# Patient Record
Sex: FEMALE | Race: WHITE | Employment: OTHER | URBAN - METROPOLITAN AREA
[De-identification: names, ages, dates, MRNs, and addresses within clinical notes are randomized per-mention and may not be internally consistent; named-entity substitution may affect disease eponyms.]

---

## 2018-01-01 ENCOUNTER — PREP FOR PROCEDURE (OUTPATIENT)
Dept: VASCULAR SURGERY | Age: 78
End: 2018-01-01

## 2018-01-01 ENCOUNTER — HOSPITAL ENCOUNTER (INPATIENT)
Age: 78
LOS: 2 days | DRG: 441 | End: 2018-12-15
Attending: EMERGENCY MEDICINE | Admitting: INTERNAL MEDICINE
Payer: MEDICARE

## 2018-01-01 ENCOUNTER — APPOINTMENT (OUTPATIENT)
Dept: CT IMAGING | Age: 78
End: 2018-01-01
Payer: MEDICARE

## 2018-01-01 ENCOUNTER — OFFICE VISIT (OUTPATIENT)
Dept: FAMILY MEDICINE CLINIC | Age: 78
End: 2018-01-01
Payer: MEDICARE

## 2018-01-01 ENCOUNTER — ANESTHESIA EVENT (OUTPATIENT)
Dept: OPERATING ROOM | Age: 78
End: 2018-01-01
Payer: MEDICARE

## 2018-01-01 ENCOUNTER — TELEPHONE (OUTPATIENT)
Dept: FAMILY MEDICINE CLINIC | Age: 78
End: 2018-01-01

## 2018-01-01 ENCOUNTER — APPOINTMENT (OUTPATIENT)
Dept: GENERAL RADIOLOGY | Age: 78
DRG: 441 | End: 2018-01-01
Payer: MEDICARE

## 2018-01-01 ENCOUNTER — APPOINTMENT (OUTPATIENT)
Dept: CT IMAGING | Age: 78
DRG: 441 | End: 2018-01-01
Payer: MEDICARE

## 2018-01-01 ENCOUNTER — ANESTHESIA (OUTPATIENT)
Dept: OPERATING ROOM | Age: 78
End: 2018-01-01
Payer: MEDICARE

## 2018-01-01 ENCOUNTER — APPOINTMENT (OUTPATIENT)
Dept: ULTRASOUND IMAGING | Age: 78
DRG: 441 | End: 2018-01-01
Payer: MEDICARE

## 2018-01-01 ENCOUNTER — HOSPITAL ENCOUNTER (OUTPATIENT)
Age: 78
Discharge: HOME | End: 2018-06-09
Payer: MEDICARE

## 2018-01-01 ENCOUNTER — APPOINTMENT (OUTPATIENT)
Dept: GENERAL RADIOLOGY | Age: 78
End: 2018-01-01
Payer: MEDICARE

## 2018-01-01 ENCOUNTER — HOSPITAL ENCOUNTER (OUTPATIENT)
Age: 78
Discharge: HOME | End: 2018-06-27
Payer: MEDICARE

## 2018-01-01 ENCOUNTER — HOSPITAL ENCOUNTER (OUTPATIENT)
Age: 78
Setting detail: OUTPATIENT SURGERY
Discharge: HOME | End: 2018-03-14
Attending: SPECIALIST | Admitting: SPECIALIST
Payer: MEDICARE

## 2018-01-01 ENCOUNTER — HOSPITAL ENCOUNTER (EMERGENCY)
Age: 78
Discharge: HOME | End: 2018-12-10
Attending: EMERGENCY MEDICINE
Payer: MEDICARE

## 2018-01-01 VITALS
RESPIRATION RATE: 18 BRPM | BODY MASS INDEX: 31.61 KG/M2 | OXYGEN SATURATION: 97 % | HEART RATE: 99 BPM | DIASTOLIC BLOOD PRESSURE: 80 MMHG | TEMPERATURE: 98.1 F | SYSTOLIC BLOOD PRESSURE: 148 MMHG | HEIGHT: 60 IN | WEIGHT: 161 LBS

## 2018-01-01 VITALS
SYSTOLIC BLOOD PRESSURE: 49 MMHG | OXYGEN SATURATION: 93 % | WEIGHT: 170.2 LBS | BODY MASS INDEX: 33.41 KG/M2 | DIASTOLIC BLOOD PRESSURE: 38 MMHG | HEIGHT: 60 IN | HEART RATE: 41 BPM | TEMPERATURE: 96 F

## 2018-01-01 VITALS
OXYGEN SATURATION: 98 % | SYSTOLIC BLOOD PRESSURE: 124 MMHG | TEMPERATURE: 98.4 F | DIASTOLIC BLOOD PRESSURE: 74 MMHG | HEART RATE: 104 BPM | WEIGHT: 170 LBS | HEIGHT: 60 IN | RESPIRATION RATE: 20 BRPM | BODY MASS INDEX: 33.38 KG/M2

## 2018-01-01 VITALS
DIASTOLIC BLOOD PRESSURE: 82 MMHG | RESPIRATION RATE: 16 BRPM | SYSTOLIC BLOOD PRESSURE: 128 MMHG | HEIGHT: 60 IN | OXYGEN SATURATION: 95 % | TEMPERATURE: 98.2 F | WEIGHT: 160 LBS | HEART RATE: 103 BPM | BODY MASS INDEX: 31.41 KG/M2

## 2018-01-01 VITALS
WEIGHT: 167 LBS | OXYGEN SATURATION: 96 % | TEMPERATURE: 97.8 F | RESPIRATION RATE: 16 BRPM | HEIGHT: 61 IN | DIASTOLIC BLOOD PRESSURE: 76 MMHG | HEART RATE: 80 BPM | BODY MASS INDEX: 31.53 KG/M2 | SYSTOLIC BLOOD PRESSURE: 130 MMHG

## 2018-01-01 VITALS
SYSTOLIC BLOOD PRESSURE: 158 MMHG | RESPIRATION RATE: 23 BRPM | OXYGEN SATURATION: 97 % | BODY MASS INDEX: 31.41 KG/M2 | DIASTOLIC BLOOD PRESSURE: 113 MMHG | HEIGHT: 60 IN | HEART RATE: 103 BPM | TEMPERATURE: 98 F | WEIGHT: 160 LBS

## 2018-01-01 VITALS
OXYGEN SATURATION: 97 % | RESPIRATION RATE: 16 BRPM | TEMPERATURE: 97.8 F | DIASTOLIC BLOOD PRESSURE: 80 MMHG | SYSTOLIC BLOOD PRESSURE: 124 MMHG | HEART RATE: 106 BPM | HEIGHT: 60 IN | WEIGHT: 170 LBS | BODY MASS INDEX: 33.38 KG/M2

## 2018-01-01 VITALS
BODY MASS INDEX: 31.53 KG/M2 | SYSTOLIC BLOOD PRESSURE: 104 MMHG | HEIGHT: 61 IN | DIASTOLIC BLOOD PRESSURE: 70 MMHG | TEMPERATURE: 98.2 F | RESPIRATION RATE: 16 BRPM | OXYGEN SATURATION: 99 % | HEART RATE: 95 BPM | WEIGHT: 167 LBS

## 2018-01-01 VITALS
OXYGEN SATURATION: 99 % | RESPIRATION RATE: 16 BRPM | DIASTOLIC BLOOD PRESSURE: 87 MMHG | SYSTOLIC BLOOD PRESSURE: 158 MMHG | TEMPERATURE: 98.8 F

## 2018-01-01 DIAGNOSIS — L02.31 ABSCESS OF LEFT BUTTOCK: ICD-10-CM

## 2018-01-01 DIAGNOSIS — I50.9 ACUTE CONGESTIVE HEART FAILURE, UNSPECIFIED HEART FAILURE TYPE (HCC): Primary | ICD-10-CM

## 2018-01-01 DIAGNOSIS — I77.0 AV FISTULA (HCC): Primary | ICD-10-CM

## 2018-01-01 DIAGNOSIS — K59.00 OBSTIPATION: ICD-10-CM

## 2018-01-01 DIAGNOSIS — L30.9 ECZEMA, UNSPECIFIED TYPE: ICD-10-CM

## 2018-01-01 DIAGNOSIS — F32.A DEPRESSION, UNSPECIFIED DEPRESSION TYPE: ICD-10-CM

## 2018-01-01 DIAGNOSIS — M19.90 ARTHRITIS: Primary | ICD-10-CM

## 2018-01-01 DIAGNOSIS — M10.141 LEAD-INDUCED ACUTE GOUT OF RIGHT HAND, INITIAL ENCOUNTER: Primary | ICD-10-CM

## 2018-01-01 DIAGNOSIS — M1A.1490: ICD-10-CM

## 2018-01-01 DIAGNOSIS — N17.9 ACUTE RENAL FAILURE, UNSPECIFIED ACUTE RENAL FAILURE TYPE (HCC): Primary | ICD-10-CM

## 2018-01-01 DIAGNOSIS — E03.9 ACQUIRED HYPOTHYROIDISM: ICD-10-CM

## 2018-01-01 DIAGNOSIS — M25.541 ARTHRALGIA OF RIGHT HAND: ICD-10-CM

## 2018-01-01 DIAGNOSIS — M10.141 LEAD-INDUCED ACUTE GOUT OF RIGHT HAND, INITIAL ENCOUNTER: ICD-10-CM

## 2018-01-01 DIAGNOSIS — D82.4 RECURRING COLD STAPHYLOCOCCAL ABSCESSES (HCC): ICD-10-CM

## 2018-01-01 DIAGNOSIS — T56.0X1A LEAD-INDUCED ACUTE GOUT OF RIGHT HAND, INITIAL ENCOUNTER: ICD-10-CM

## 2018-01-01 DIAGNOSIS — E79.0 HYPERURICEMIA: ICD-10-CM

## 2018-01-01 DIAGNOSIS — E16.2 HYPOGLYCEMIA: ICD-10-CM

## 2018-01-01 DIAGNOSIS — T56.0X1A LEAD-INDUCED ACUTE GOUT OF RIGHT HAND, INITIAL ENCOUNTER: Primary | ICD-10-CM

## 2018-01-01 DIAGNOSIS — T56.0X1A: ICD-10-CM

## 2018-01-01 DIAGNOSIS — E87.5 HYPERKALEMIA: ICD-10-CM

## 2018-01-01 DIAGNOSIS — Z00.00 ROUTINE GENERAL MEDICAL EXAMINATION AT A HEALTH CARE FACILITY: Primary | ICD-10-CM

## 2018-01-01 DIAGNOSIS — K59.00 OBSTIPATION: Primary | ICD-10-CM

## 2018-01-01 DIAGNOSIS — I10 ESSENTIAL HYPERTENSION: Primary | ICD-10-CM

## 2018-01-01 DIAGNOSIS — F32.2 MAJOR DEPRESSIVE DISORDER, SINGLE EPISODE, SEVERE WITHOUT PSYCHOTIC FEATURES (HCC): ICD-10-CM

## 2018-01-01 DIAGNOSIS — L02.31 ABSCESS OF LEFT BUTTOCK: Primary | ICD-10-CM

## 2018-01-01 DIAGNOSIS — I10 ESSENTIAL HYPERTENSION: ICD-10-CM

## 2018-01-01 DIAGNOSIS — L89.322 PRESSURE INJURY OF LEFT BUTTOCK, STAGE 2 (HCC): ICD-10-CM

## 2018-01-01 DIAGNOSIS — E86.0 DEHYDRATION: ICD-10-CM

## 2018-01-01 DIAGNOSIS — K72.00 ACUTE LIVER FAILURE WITHOUT HEPATIC COMA: ICD-10-CM

## 2018-01-01 DIAGNOSIS — R41.82 ALTERED MENTAL STATUS, UNSPECIFIED ALTERED MENTAL STATUS TYPE: ICD-10-CM

## 2018-01-01 DIAGNOSIS — N18.4 CHRONIC KIDNEY DISEASE, STAGE IV (SEVERE) (HCC): ICD-10-CM

## 2018-01-01 LAB
ABO/RH: NORMAL
ACETAMINOPHEN LEVEL: <5 MCG/ML (ref 10–30)
ALBUMIN SERPL-MCNC: 3.2 G/DL (ref 3.5–5.2)
ALBUMIN SERPL-MCNC: 4.1 G/DL (ref 3.5–5.2)
ALBUMIN SERPL-MCNC: 4.1 G/DL (ref 3.5–5.2)
ALP BLD-CCNC: 70 U/L (ref 35–104)
ALP BLD-CCNC: 71 U/L (ref 35–104)
ALP BLD-CCNC: 80 U/L (ref 35–104)
ALP BLD-CCNC: 82 U/L (ref 35–104)
ALP BLD-CCNC: 88 U/L (ref 35–104)
ALT SERPL-CCNC: 4019 U/L (ref 0–32)
ALT SERPL-CCNC: 41 U/L (ref 0–32)
ALT SERPL-CCNC: 4108 U/L (ref 0–32)
ALT SERPL-CCNC: 4292 U/L (ref 0–32)
ALT SERPL-CCNC: 4664 U/L (ref 0–32)
AMMONIA: 32 UMOL/L (ref 11–51)
AMMONIA: 34 UMOL/L (ref 11–51)
AMMONIA: 69 UMOL/L (ref 11–51)
AMMONIA: 74 UMOL/L (ref 11–51)
AMMONIA: 87 UMOL/L (ref 11–51)
AMPHETAMINE SCREEN, URINE: POSITIVE
ANION GAP SERPL CALCULATED.3IONS-SCNC: 14 MMOL/L (ref 7–16)
ANION GAP SERPL CALCULATED.3IONS-SCNC: 17 MMOL/L (ref 7–16)
ANION GAP SERPL CALCULATED.3IONS-SCNC: 25 MMOL/L (ref 7–16)
ANION GAP SERPL CALCULATED.3IONS-SCNC: 25 MMOL/L (ref 7–16)
ANION GAP SERPL CALCULATED.3IONS-SCNC: 26 MMOL/L (ref 7–16)
ANION GAP SERPL CALCULATED.3IONS-SCNC: 26 MMOL/L (ref 7–16)
ANION GAP SERPL CALCULATED.3IONS-SCNC: 27 MMOL/L (ref 7–16)
ANION GAP SERPL CALCULATED.3IONS-SCNC: 27 MMOL/L (ref 7–16)
ANTI-MITOCHONDRIAL AB, IFA: NEGATIVE
ANTI-NUCLEAR ANTIBODY (ANA): NEGATIVE
ANTIBODY SCREEN: NORMAL
APTT: 24 SEC (ref 24.5–35.1)
APTT: 38.7 SEC (ref 24.5–35.1)
AST SERPL-CCNC: 33 U/L (ref 0–31)
AST SERPL-CCNC: 5339 U/L (ref 0–31)
AST SERPL-CCNC: 5541 U/L (ref 0–31)
AST SERPL-CCNC: 6287 U/L (ref 0–31)
AST SERPL-CCNC: >7000 U/L (ref 0–31)
B.E.: -10.3 MMOL/L (ref -3–3)
B.E.: -15.1 MMOL/L (ref -3–3)
B.E.: -16.1 MMOL/L (ref -3–3)
B.E.: -6.3 MMOL/L (ref -3–3)
BACTERIA: ABNORMAL /HPF
BACTERIA: NORMAL /HPF
BARBITURATE SCREEN URINE: NOT DETECTED
BASOPHILS ABSOLUTE: 0 E9/L (ref 0–0.2)
BASOPHILS ABSOLUTE: 0 E9/L (ref 0–0.2)
BASOPHILS ABSOLUTE: 0.04 E9/L (ref 0–0.2)
BASOPHILS ABSOLUTE: 0.04 E9/L (ref 0–0.2)
BASOPHILS RELATIVE PERCENT: 0 % (ref 0–2)
BASOPHILS RELATIVE PERCENT: 0.1 % (ref 0–2)
BASOPHILS RELATIVE PERCENT: 0.7 % (ref 0–2)
BASOPHILS RELATIVE PERCENT: 0.8 % (ref 0–2)
BENZODIAZEPINE SCREEN, URINE: NOT DETECTED
BILIRUB SERPL-MCNC: 0.6 MG/DL (ref 0–1.2)
BILIRUB SERPL-MCNC: 1.9 MG/DL (ref 0–1.2)
BILIRUB SERPL-MCNC: 2.2 MG/DL (ref 0–1.2)
BILIRUB SERPL-MCNC: 2.9 MG/DL (ref 0–1.2)
BILIRUB SERPL-MCNC: 3 MG/DL (ref 0–1.2)
BILIRUBIN DIRECT: 1.1 MG/DL (ref 0–0.3)
BILIRUBIN DIRECT: 1.3 MG/DL (ref 0–0.3)
BILIRUBIN DIRECT: 1.6 MG/DL (ref 0–0.3)
BILIRUBIN URINE: NEGATIVE
BILIRUBIN URINE: NEGATIVE
BILIRUBIN, INDIRECT: 0.8 MG/DL (ref 0–1)
BILIRUBIN, INDIRECT: 1.3 MG/DL (ref 0–1)
BLOOD BANK DISPENSE STATUS: NORMAL
BLOOD BANK PRODUCT CODE: NORMAL
BLOOD CULTURE, ROUTINE: NORMAL
BLOOD, URINE: ABNORMAL
BLOOD, URINE: ABNORMAL
BPU ID: NORMAL
BUN BLDV-MCNC: 28 MG/DL (ref 8–23)
BUN BLDV-MCNC: 43 MG/DL (ref 8–23)
BUN BLDV-MCNC: 45 MG/DL (ref 8–23)
BUN BLDV-MCNC: 46 MG/DL (ref 8–23)
BUN BLDV-MCNC: 48 MG/DL (ref 8–23)
BUN BLDV-MCNC: 52 MG/DL (ref 8–23)
BUN BLDV-MCNC: 65 MG/DL (ref 8–23)
BUN BLDV-MCNC: 66 MG/DL (ref 8–23)
BURR CELLS: ABNORMAL
CALCIUM SERPL-MCNC: 10.3 MG/DL (ref 8.6–10.2)
CALCIUM SERPL-MCNC: 10.4 MG/DL (ref 8.6–10.2)
CALCIUM SERPL-MCNC: 7.4 MG/DL (ref 8.6–10.2)
CALCIUM SERPL-MCNC: 7.5 MG/DL (ref 8.6–10.2)
CALCIUM SERPL-MCNC: 7.6 MG/DL (ref 8.6–10.2)
CALCIUM SERPL-MCNC: 8.1 MG/DL (ref 8.6–10.2)
CALCIUM SERPL-MCNC: 8.8 MG/DL (ref 8.6–10.2)
CALCIUM SERPL-MCNC: 9.7 MG/DL (ref 8.6–10.2)
CANNABINOID SCREEN URINE: NOT DETECTED
CERULOPLASMIN: 25 MG/DL (ref 16–45)
CHLORIDE BLD-SCNC: 100 MMOL/L (ref 98–107)
CHLORIDE BLD-SCNC: 105 MMOL/L (ref 98–107)
CHLORIDE BLD-SCNC: 91 MMOL/L (ref 98–107)
CHLORIDE BLD-SCNC: 93 MMOL/L (ref 98–107)
CHLORIDE BLD-SCNC: 93 MMOL/L (ref 98–107)
CHLORIDE BLD-SCNC: 99 MMOL/L (ref 98–107)
CHOLESTEROL, TOTAL: 79 MG/DL (ref 0–199)
CK MB: 37.5 NG/ML (ref 0–4.3)
CLARITY: ABNORMAL
CLARITY: CLEAR
CO2: 12 MMOL/L (ref 22–29)
CO2: 13 MMOL/L (ref 22–29)
CO2: 15 MMOL/L (ref 22–29)
CO2: 18 MMOL/L (ref 22–29)
CO2: 19 MMOL/L (ref 22–29)
CO2: 20 MMOL/L (ref 22–29)
CO2: 23 MMOL/L (ref 22–29)
CO2: 24 MMOL/L (ref 22–29)
COCAINE METABOLITE SCREEN URINE: NOT DETECTED
COHB: 0.3 % (ref 0–1.5)
COLOR: ABNORMAL
COLOR: YELLOW
COMMENT: ABNORMAL
CREAT SERPL-MCNC: 2.8 MG/DL (ref 0.5–1)
CREAT SERPL-MCNC: 2.8 MG/DL (ref 0.5–1)
CREAT SERPL-MCNC: 2.9 MG/DL (ref 0.5–1)
CREAT SERPL-MCNC: 3.4 MG/DL (ref 0.5–1)
CREAT SERPL-MCNC: 3.5 MG/DL (ref 0.5–1)
CREAT SERPL-MCNC: 4 MG/DL (ref 0.5–1)
CREAT SERPL-MCNC: 4.8 MG/DL (ref 0.5–1)
CREAT SERPL-MCNC: 4.9 MG/DL (ref 0.5–1)
CRITICAL: ABNORMAL
CULTURE, BLOOD 2: ABNORMAL
CULTURE, BLOOD 2: NORMAL
D DIMER: >5250 NG/ML DDU
DAT POLYSPECIFIC: NORMAL
DATE ANALYZED: ABNORMAL
DATE OF COLLECTION: ABNORMAL
DESCRIPTION BLOOD BANK: NORMAL
EKG ATRIAL RATE: 73 BPM
EKG ATRIAL RATE: 84 BPM
EKG ATRIAL RATE: 95 BPM
EKG P AXIS: 19 DEGREES
EKG P AXIS: 55 DEGREES
EKG P AXIS: 64 DEGREES
EKG P-R INTERVAL: 146 MS
EKG P-R INTERVAL: 166 MS
EKG P-R INTERVAL: 168 MS
EKG Q-T INTERVAL: 372 MS
EKG Q-T INTERVAL: 416 MS
EKG Q-T INTERVAL: 466 MS
EKG QRS DURATION: 86 MS
EKG QRS DURATION: 90 MS
EKG QRS DURATION: 94 MS
EKG QTC CALCULATION (BAZETT): 467 MS
EKG QTC CALCULATION (BAZETT): 491 MS
EKG QTC CALCULATION (BAZETT): 513 MS
EKG R AXIS: 20 DEGREES
EKG R AXIS: 56 DEGREES
EKG R AXIS: 61 DEGREES
EKG T AXIS: 16 DEGREES
EKG T AXIS: 35 DEGREES
EKG T AXIS: 61 DEGREES
EKG VENTRICULAR RATE: 73 BPM
EKG VENTRICULAR RATE: 84 BPM
EKG VENTRICULAR RATE: 95 BPM
EOSINOPHILS ABSOLUTE: 0 E9/L (ref 0.05–0.5)
EOSINOPHILS ABSOLUTE: 0.11 E9/L (ref 0.05–0.5)
EOSINOPHILS ABSOLUTE: 0.19 E9/L (ref 0.05–0.5)
EOSINOPHILS ABSOLUTE: 0.27 E9/L (ref 0.05–0.5)
EOSINOPHILS RELATIVE PERCENT: 0 % (ref 0–6)
EOSINOPHILS RELATIVE PERCENT: 1.8 % (ref 0–6)
EOSINOPHILS RELATIVE PERCENT: 3.4 % (ref 0–6)
EOSINOPHILS RELATIVE PERCENT: 5.2 % (ref 0–6)
EPITHELIAL CELLS, UA: ABNORMAL /HPF
EPSTEIN-BARR VCA IGM: <10 U/ML (ref 0–43.9)
ETHANOL: <10 MG/DL (ref 0–0.08)
FERRITIN: 4792 NG/ML
FIBRINOGEN: <70 MG/DL (ref 225–540)
FOLATE: >20 NG/ML (ref 4.8–24.2)
GFR AFRICAN AMERICAN: 10
GFR AFRICAN AMERICAN: 11
GFR AFRICAN AMERICAN: 13
GFR AFRICAN AMERICAN: 15
GFR AFRICAN AMERICAN: 16
GFR AFRICAN AMERICAN: 19
GFR AFRICAN AMERICAN: 20
GFR AFRICAN AMERICAN: 20
GFR NON-AFRICAN AMERICAN: 11 ML/MIN/1.73
GFR NON-AFRICAN AMERICAN: 13 ML/MIN/1.73
GFR NON-AFRICAN AMERICAN: 13 ML/MIN/1.73
GFR NON-AFRICAN AMERICAN: 16 ML/MIN/1.73
GFR NON-AFRICAN AMERICAN: 9 ML/MIN/1.73
GFR NON-AFRICAN AMERICAN: 9 ML/MIN/1.73
GLUCOSE BLD-MCNC: 104 MG/DL (ref 74–99)
GLUCOSE BLD-MCNC: 108 MG/DL (ref 74–109)
GLUCOSE BLD-MCNC: 124 MG/DL (ref 74–99)
GLUCOSE BLD-MCNC: 138 MG/DL (ref 74–99)
GLUCOSE BLD-MCNC: 150 MG/DL (ref 74–99)
GLUCOSE BLD-MCNC: 178 MG/DL (ref 74–99)
GLUCOSE BLD-MCNC: 180 MG/DL (ref 74–99)
GLUCOSE BLD-MCNC: 215 MG/DL (ref 74–99)
GLUCOSE BLD-MCNC: 86 MG/DL
GLUCOSE BLD-MCNC: 86 MG/DL
GLUCOSE URINE: NEGATIVE MG/DL
GLUCOSE URINE: NEGATIVE MG/DL
GRAM STAIN RESULT: ABNORMAL
HAPTOGLOBIN: <10 MG/DL (ref 30–200)
HAV IGM SER IA-ACNC: NORMAL
HBA1C MFR BLD: 4.9 % (ref 4–5.6)
HBV SURFACE AB TITR SER: NORMAL {TITER}
HCO3: 10.2 MMOL/L (ref 22–26)
HCO3: 10.3 MMOL/L (ref 22–26)
HCO3: 14.1 MMOL/L (ref 22–26)
HCO3: 15.8 MMOL/L (ref 22–26)
HCT VFR BLD CALC: 24.7 % (ref 34–48)
HCT VFR BLD CALC: 29.3 % (ref 34–48)
HCT VFR BLD CALC: 32.3 % (ref 34–48)
HCT VFR BLD CALC: 32.4 % (ref 34–48)
HCT VFR BLD CALC: 32.8 % (ref 34–48)
HDLC SERPL-MCNC: 39 MG/DL
HEMOGLOBIN: 10.2 G/DL (ref 11.5–15.5)
HEMOGLOBIN: 10.8 G/DL (ref 11.5–15.5)
HEMOGLOBIN: 7.5 G/DL (ref 11.5–15.5)
HEMOGLOBIN: 9.7 G/DL (ref 11.5–15.5)
HEMOGLOBIN: 9.8 G/DL (ref 11.5–15.5)
HEPATITIS B CORE IGM ANTIBODY: NORMAL
HEPATITIS B CORE TOTAL ANTIBODY: NONREACTIVE
HEPATITIS B SURFACE ANTIGEN INTERPRETATION: NORMAL
HEPATITIS C ANTIBODY INTERPRETATION: NORMAL
HEPATITIS C ANTIBODY INTERPRETATION: NORMAL
HHB: 2.7 % (ref 0–5)
HHB: 4.7 % (ref 0–5)
HHB: 4.9 % (ref 0–5)
HHB: 5.4 % (ref 0–5)
HYPOCHROMIA: ABNORMAL
IGA: 68 MG/DL (ref 70–400)
IGG: 537 MG/DL (ref 700–1600)
IGM: <25 MG/DL (ref 40–230)
IMMATURE GRANULOCYTES #: 0.01 E9/L
IMMATURE GRANULOCYTES #: 0.02 E9/L
IMMATURE GRANULOCYTES %: 0.2 % (ref 0–5)
IMMATURE GRANULOCYTES %: 0.4 % (ref 0–5)
INR BLD: 1
INR BLD: 2.8
INR BLD: >10
IRON SATURATION: 85 % (ref 15–50)
IRON: 233 MCG/DL (ref 37–145)
KETONES, URINE: ABNORMAL MG/DL
KETONES, URINE: NEGATIVE MG/DL
LAB: ABNORMAL
LACTATE DEHYDROGENASE: 3790 U/L (ref 135–214)
LACTIC ACID: 1.5 MMOL/L (ref 0.5–2.2)
LACTIC ACID: 6.8 MMOL/L (ref 0.5–2.2)
LACTIC ACID: 7.5 MMOL/L (ref 0.5–2.2)
LACTIC ACID: 8.5 MMOL/L (ref 0.5–2.2)
LACTIC ACID: 9.5 MMOL/L (ref 0.5–2.2)
LDL CHOLESTEROL CALCULATED: 21 MG/DL (ref 0–99)
LEUKOCYTE ESTERASE, URINE: ABNORMAL
LEUKOCYTE ESTERASE, URINE: NEGATIVE
LYMPHOCYTES ABSOLUTE: 0.13 E9/L (ref 1.5–4)
LYMPHOCYTES ABSOLUTE: 0.37 E9/L (ref 1.5–4)
LYMPHOCYTES ABSOLUTE: 0.65 E9/L (ref 1.5–4)
LYMPHOCYTES ABSOLUTE: 1.13 E9/L (ref 1.5–4)
LYMPHOCYTES RELATIVE PERCENT: 1.8 % (ref 20–42)
LYMPHOCYTES RELATIVE PERCENT: 11.5 % (ref 20–42)
LYMPHOCYTES RELATIVE PERCENT: 21.6 % (ref 20–42)
LYMPHOCYTES RELATIVE PERCENT: 4.3 % (ref 20–42)
Lab: ABNORMAL
MAGNESIUM: 1.8 MG/DL (ref 1.6–2.6)
MCH RBC QN AUTO: 30.3 PG (ref 26–35)
MCH RBC QN AUTO: 31.6 PG (ref 26–35)
MCH RBC QN AUTO: 31.8 PG (ref 26–35)
MCH RBC QN AUTO: 31.8 PG (ref 26–35)
MCH RBC QN AUTO: 32 PG (ref 26–35)
MCHC RBC AUTO-ENTMCNC: 30 % (ref 32–34.5)
MCHC RBC AUTO-ENTMCNC: 30.4 % (ref 32–34.5)
MCHC RBC AUTO-ENTMCNC: 31.5 % (ref 32–34.5)
MCHC RBC AUTO-ENTMCNC: 32.9 % (ref 32–34.5)
MCHC RBC AUTO-ENTMCNC: 33.4 % (ref 32–34.5)
MCV RBC AUTO: 100.9 FL (ref 80–99.9)
MCV RBC AUTO: 104.7 FL (ref 80–99.9)
MCV RBC AUTO: 106.6 FL (ref 80–99.9)
MCV RBC AUTO: 92.1 FL (ref 80–99.9)
MCV RBC AUTO: 94.5 FL (ref 80–99.9)
METER GLUCOSE: 109 MG/DL (ref 74–99)
METER GLUCOSE: 114 MG/DL (ref 74–99)
METER GLUCOSE: 117 MG/DL (ref 74–99)
METER GLUCOSE: 125 MG/DL (ref 74–99)
METER GLUCOSE: 173 MG/DL (ref 74–99)
METER GLUCOSE: 213 MG/DL (ref 74–99)
METER GLUCOSE: 86 MG/DL (ref 74–99)
METER GLUCOSE: 86 MG/DL (ref 74–99)
METER GLUCOSE: <40 MG/DL (ref 74–99)
METHADONE SCREEN, URINE: NOT DETECTED
METHB: 0.3 % (ref 0–1.5)
METHB: 0.4 % (ref 0–1.5)
METHB: 0.4 % (ref 0–1.5)
METHB: 0.5 % (ref 0–1.5)
MODE: ABNORMAL
MONOCYTES ABSOLUTE: 0.13 E9/L (ref 0.1–0.95)
MONOCYTES ABSOLUTE: 0.37 E9/L (ref 0.1–0.95)
MONOCYTES ABSOLUTE: 0.6 E9/L (ref 0.1–0.95)
MONOCYTES ABSOLUTE: 0.61 E9/L (ref 0.1–0.95)
MONOCYTES RELATIVE PERCENT: 1.8 % (ref 2–12)
MONOCYTES RELATIVE PERCENT: 10.6 % (ref 2–12)
MONOCYTES RELATIVE PERCENT: 11.7 % (ref 2–12)
MONOCYTES RELATIVE PERCENT: 4.3 % (ref 2–12)
MRSA CULTURE ONLY: NORMAL
NEUTROPHILS ABSOLUTE: 3.16 E9/L (ref 1.8–7.3)
NEUTROPHILS ABSOLUTE: 4.16 E9/L (ref 1.8–7.3)
NEUTROPHILS ABSOLUTE: 5.99 E9/L (ref 1.8–7.3)
NEUTROPHILS ABSOLUTE: 8.46 E9/L (ref 1.8–7.3)
NEUTROPHILS RELATIVE PERCENT: 60.3 % (ref 43–80)
NEUTROPHILS RELATIVE PERCENT: 73.6 % (ref 43–80)
NEUTROPHILS RELATIVE PERCENT: 91.3 % (ref 43–80)
NEUTROPHILS RELATIVE PERCENT: 94.7 % (ref 43–80)
NITRITE, URINE: NEGATIVE
NITRITE, URINE: NEGATIVE
NUCLEATED RED BLOOD CELLS: 1.8 /100 WBC
O2 CONTENT: 12.4 ML/DL
O2 CONTENT: 14.2 ML/DL
O2 CONTENT: 14.5 ML/DL
O2 CONTENT: 15.1 ML/DL
O2 SATURATION: 94.6 % (ref 92–98.5)
O2 SATURATION: 95.1 % (ref 92–98.5)
O2 SATURATION: 95.3 % (ref 92–98.5)
O2 SATURATION: 97.3 % (ref 92–98.5)
O2HB: 93.9 % (ref 94–97)
O2HB: 94.5 % (ref 94–97)
O2HB: 94.6 % (ref 94–97)
O2HB: 96.5 % (ref 94–97)
OPERATOR ID: 3
OPERATOR ID: 30
OPERATOR ID: ABNORMAL
OPERATOR ID: ABNORMAL
OPIATE SCREEN URINE: NOT DETECTED
ORGANISM: ABNORMAL
OVALOCYTES: ABNORMAL
OVALOCYTES: ABNORMAL
PATHOLOGIST REVIEW: NORMAL
PATIENT TEMP: 37 C
PCO2: 22.1 MMHG (ref 35–45)
PCO2: 23.3 MMHG (ref 35–45)
PCO2: 26.6 MMHG (ref 35–45)
PCO2: 26.7 MMHG (ref 35–45)
PDW BLD-RTO: 12.6 FL (ref 11.5–15)
PDW BLD-RTO: 13.5 FL (ref 11.5–15)
PDW BLD-RTO: 13.7 FL (ref 11.5–15)
PDW BLD-RTO: 13.7 FL (ref 11.5–15)
PDW BLD-RTO: 14.6 FL (ref 11.5–15)
PH BLOOD GAS: 7.21 (ref 7.35–7.45)
PH BLOOD GAS: 7.26 (ref 7.35–7.45)
PH BLOOD GAS: 7.34 (ref 7.35–7.45)
PH BLOOD GAS: 7.47 (ref 7.35–7.45)
PH UA: 5 (ref 5–9)
PH UA: 5 (ref 5–9)
PHENCYCLIDINE SCREEN URINE: NOT DETECTED
PHOSPHORUS: 3.3 MG/DL (ref 2.5–4.5)
PLATELET # BLD: 201 E9/L (ref 130–450)
PLATELET # BLD: 214 E9/L (ref 130–450)
PLATELET # BLD: 31 E9/L (ref 130–450)
PLATELET # BLD: 38 E9/L (ref 130–450)
PLATELET # BLD: 58 E9/L (ref 130–450)
PLATELET CONFIRMATION: NORMAL
PMV BLD AUTO: 10.9 FL (ref 7–12)
PMV BLD AUTO: 12 FL (ref 7–12)
PMV BLD AUTO: 12 FL (ref 7–12)
PMV BLD AUTO: 12.9 FL (ref 7–12)
PMV BLD AUTO: 9.8 FL (ref 7–12)
PO2: 141.2 MMHG (ref 60–100)
PO2: 83.4 MMHG (ref 60–100)
PO2: 88.3 MMHG (ref 60–100)
PO2: 97.8 MMHG (ref 60–100)
POIKILOCYTES: ABNORMAL
POIKILOCYTES: ABNORMAL
POLYCHROMASIA: ABNORMAL
POTASSIUM REFLEX MAGNESIUM: 3.9 MMOL/L (ref 3.5–5)
POTASSIUM SERPL-SCNC: 4 MMOL/L (ref 3.5–5)
POTASSIUM SERPL-SCNC: 4.1 MMOL/L (ref 3.5–5)
POTASSIUM SERPL-SCNC: 4.4 MMOL/L (ref 3.5–5)
POTASSIUM SERPL-SCNC: 4.5 MMOL/L (ref 3.5–5)
POTASSIUM SERPL-SCNC: 4.7 MMOL/L (ref 3.5–5)
POTASSIUM SERPL-SCNC: 5.5 MMOL/L (ref 3.5–5)
POTASSIUM SERPL-SCNC: 6.1 MMOL/L (ref 3.5–5)
PRO-BNP: ABNORMAL PG/ML (ref 0–450)
PROPOXYPHENE SCREEN: NOT DETECTED
PROTEIN UA: 100 MG/DL
PROTEIN UA: 100 MG/DL
PROTHROMBIN TIME: 10.6 SEC (ref 9.3–12.4)
PROTHROMBIN TIME: 31.7 SEC (ref 9.3–12.4)
PROTHROMBIN TIME: >120 SEC (ref 9.3–12.4)
RBC # BLD: 2.36 E12/L (ref 3.5–5.5)
RBC # BLD: 3.03 E12/L (ref 3.5–5.5)
RBC # BLD: 3.1 E12/L (ref 3.5–5.5)
RBC # BLD: 3.21 E12/L (ref 3.5–5.5)
RBC # BLD: 3.56 E12/L (ref 3.5–5.5)
RBC UA: ABNORMAL /HPF (ref 0–2)
RBC UA: NORMAL /HPF (ref 0–2)
RR MECHANICAL: 18 B/MIN
SALICYLATE, SERUM: 0.6 MG/DL (ref 0–30)
SMOOTH MUSCLE ANTIBODY: NEGATIVE
SODIUM BLD-SCNC: 137 MMOL/L (ref 132–146)
SODIUM BLD-SCNC: 139 MMOL/L (ref 132–146)
SODIUM BLD-SCNC: 139 MMOL/L (ref 132–146)
SODIUM BLD-SCNC: 140 MMOL/L (ref 132–146)
SODIUM BLD-SCNC: 141 MMOL/L (ref 132–146)
SODIUM BLD-SCNC: 142 MMOL/L (ref 132–146)
SOURCE, BLOOD GAS: ABNORMAL
SPECIFIC GRAVITY UA: >=1.03 (ref 1–1.03)
SPECIFIC GRAVITY UA: >=1.03 (ref 1–1.03)
THB: 10.6 G/DL (ref 11.5–16.5)
THB: 10.8 G/DL (ref 11.5–16.5)
THB: 10.9 G/DL (ref 11.5–16.5)
THB: 9.3 G/DL (ref 11.5–16.5)
TIME ANALYZED: 1314
TIME ANALYZED: 2027
TIME ANALYZED: 546
TIME ANALYZED: 734
TOTAL CK: 1300 U/L (ref 20–180)
TOTAL CK: 2595 U/L (ref 20–180)
TOTAL IRON BINDING CAPACITY: 273 MCG/DL (ref 250–450)
TOTAL PROTEIN: 4.6 G/DL (ref 6.4–8.3)
TOTAL PROTEIN: 6 G/DL (ref 6.4–8.3)
TOTAL PROTEIN: 6.5 G/DL (ref 6.4–8.3)
TRIGL SERPL-MCNC: 97 MG/DL (ref 0–149)
TROPONIN: 0.02 NG/ML (ref 0–0.03)
TROPONIN: 0.06 NG/ML (ref 0–0.03)
TROPONIN: 0.07 NG/ML (ref 0–0.03)
TROPONIN: 0.07 NG/ML (ref 0–0.03)
TSH SERPL DL<=0.05 MIU/L-ACNC: 4.01 UIU/ML (ref 0.27–4.2)
URIC ACID, SERUM: 8.7 MG/DL (ref 2.4–5.7)
UROBILINOGEN, URINE: 0.2 E.U./DL
UROBILINOGEN, URINE: 0.2 E.U./DL
VACUOLATED NEUTROPHILS: ABNORMAL
VITAMIN B-12: >2000 PG/ML (ref 211–946)
VLDLC SERPL CALC-MCNC: 19 MG/DL
WBC # BLD: 5.2 E9/L (ref 4.5–11.5)
WBC # BLD: 5.7 E9/L (ref 4.5–11.5)
WBC # BLD: 6.3 E9/L (ref 4.5–11.5)
WBC # BLD: 8.5 E9/L (ref 4.5–11.5)
WBC # BLD: 9.3 E9/L (ref 4.5–11.5)
WBC UA: ABNORMAL /HPF (ref 0–5)
WBC UA: NORMAL /HPF (ref 0–5)
WOUND/ABSCESS: ABNORMAL
WOUND/ABSCESS: ABNORMAL

## 2018-01-01 PROCEDURE — 87186 SC STD MICRODIL/AGAR DIL: CPT

## 2018-01-01 PROCEDURE — 7100000011 HC PHASE II RECOVERY - ADDTL 15 MIN: Performed by: SPECIALIST

## 2018-01-01 PROCEDURE — 1123F ACP DISCUSS/DSCN MKR DOCD: CPT | Performed by: FAMILY MEDICINE

## 2018-01-01 PROCEDURE — 83010 ASSAY OF HAPTOGLOBIN QUANT: CPT

## 2018-01-01 PROCEDURE — G8417 CALC BMI ABV UP PARAM F/U: HCPCS | Performed by: FAMILY MEDICINE

## 2018-01-01 PROCEDURE — 6360000002 HC RX W HCPCS: Performed by: EMERGENCY MEDICINE

## 2018-01-01 PROCEDURE — 86695 HERPES SIMPLEX TYPE 1 TEST: CPT

## 2018-01-01 PROCEDURE — 83605 ASSAY OF LACTIC ACID: CPT

## 2018-01-01 PROCEDURE — 85397 CLOTTING FUNCT ACTIVITY: CPT

## 2018-01-01 PROCEDURE — 80061 LIPID PANEL: CPT

## 2018-01-01 PROCEDURE — 82962 GLUCOSE BLOOD TEST: CPT

## 2018-01-01 PROCEDURE — 87040 BLOOD CULTURE FOR BACTERIA: CPT

## 2018-01-01 PROCEDURE — 2580000003 HC RX 258: Performed by: HOSPITALIST

## 2018-01-01 PROCEDURE — 6360000002 HC RX W HCPCS: Performed by: INTERNAL MEDICINE

## 2018-01-01 PROCEDURE — 86901 BLOOD TYPING SEROLOGIC RH(D): CPT

## 2018-01-01 PROCEDURE — 99214 OFFICE O/P EST MOD 30 MIN: CPT | Performed by: FAMILY MEDICINE

## 2018-01-01 PROCEDURE — 6370000000 HC RX 637 (ALT 250 FOR IP): Performed by: EMERGENCY MEDICINE

## 2018-01-01 PROCEDURE — 2580000003 HC RX 258: Performed by: INTERNAL MEDICINE

## 2018-01-01 PROCEDURE — 72125 CT NECK SPINE W/O DYE: CPT

## 2018-01-01 PROCEDURE — 36556 INSERT NON-TUNNEL CV CATH: CPT

## 2018-01-01 PROCEDURE — 3600000003 HC SURGERY LEVEL 3 BASE: Performed by: SPECIALIST

## 2018-01-01 PROCEDURE — 86850 RBC ANTIBODY SCREEN: CPT

## 2018-01-01 PROCEDURE — 82784 ASSAY IGA/IGD/IGG/IGM EACH: CPT

## 2018-01-01 PROCEDURE — 80307 DRUG TEST PRSMV CHEM ANLYZR: CPT

## 2018-01-01 PROCEDURE — 85610 PROTHROMBIN TIME: CPT

## 2018-01-01 PROCEDURE — 2000000000 HC ICU R&B

## 2018-01-01 PROCEDURE — 71045 X-RAY EXAM CHEST 1 VIEW: CPT

## 2018-01-01 PROCEDURE — 86704 HEP B CORE ANTIBODY TOTAL: CPT

## 2018-01-01 PROCEDURE — 5A1D70Z PERFORMANCE OF URINARY FILTRATION, INTERMITTENT, LESS THAN 6 HOURS PER DAY: ICD-10-PCS | Performed by: INTERNAL MEDICINE

## 2018-01-01 PROCEDURE — 82746 ASSAY OF FOLIC ACID SERUM: CPT

## 2018-01-01 PROCEDURE — 82140 ASSAY OF AMMONIA: CPT

## 2018-01-01 PROCEDURE — 6360000002 HC RX W HCPCS: Performed by: SPECIALIST

## 2018-01-01 PROCEDURE — 84484 ASSAY OF TROPONIN QUANT: CPT

## 2018-01-01 PROCEDURE — 96375 TX/PRO/DX INJ NEW DRUG ADDON: CPT

## 2018-01-01 PROCEDURE — 80074 ACUTE HEPATITIS PANEL: CPT

## 2018-01-01 PROCEDURE — 3700000000 HC ANESTHESIA ATTENDED CARE: Performed by: SPECIALIST

## 2018-01-01 PROCEDURE — 4040F PNEUMOC VAC/ADMIN/RCVD: CPT | Performed by: FAMILY MEDICINE

## 2018-01-01 PROCEDURE — 86923 COMPATIBILITY TEST ELECTRIC: CPT

## 2018-01-01 PROCEDURE — 86696 HERPES SIMPLEX TYPE 2 TEST: CPT

## 2018-01-01 PROCEDURE — 36600 WITHDRAWAL OF ARTERIAL BLOOD: CPT

## 2018-01-01 PROCEDURE — 2500000003 HC RX 250 WO HCPCS: Performed by: INTERNAL MEDICINE

## 2018-01-01 PROCEDURE — 94640 AIRWAY INHALATION TREATMENT: CPT

## 2018-01-01 PROCEDURE — 82805 BLOOD GASES W/O2 SATURATION: CPT

## 2018-01-01 PROCEDURE — 1090F PRES/ABSN URINE INCON ASSESS: CPT | Performed by: FAMILY MEDICINE

## 2018-01-01 PROCEDURE — 85025 COMPLETE CBC W/AUTO DIFF WBC: CPT

## 2018-01-01 PROCEDURE — G0480 DRUG TEST DEF 1-7 CLASSES: HCPCS

## 2018-01-01 PROCEDURE — 86694 HERPES SIMPLEX NES ANTBDY: CPT

## 2018-01-01 PROCEDURE — 86803 HEPATITIS C AB TEST: CPT

## 2018-01-01 PROCEDURE — 99213 OFFICE O/P EST LOW 20 MIN: CPT | Performed by: FAMILY MEDICINE

## 2018-01-01 PROCEDURE — 02HV33Z INSERTION OF INFUSION DEVICE INTO SUPERIOR VENA CAVA, PERCUTANEOUS APPROACH: ICD-10-PCS | Performed by: SURGERY

## 2018-01-01 PROCEDURE — 80048 BASIC METABOLIC PNL TOTAL CA: CPT

## 2018-01-01 PROCEDURE — 1036F TOBACCO NON-USER: CPT | Performed by: FAMILY MEDICINE

## 2018-01-01 PROCEDURE — 74176 CT ABD & PELVIS W/O CONTRAST: CPT

## 2018-01-01 PROCEDURE — 81001 URINALYSIS AUTO W/SCOPE: CPT

## 2018-01-01 PROCEDURE — 80053 COMPREHEN METABOLIC PANEL: CPT

## 2018-01-01 PROCEDURE — 76705 ECHO EXAM OF ABDOMEN: CPT

## 2018-01-01 PROCEDURE — 36415 COLL VENOUS BLD VENIPUNCTURE: CPT | Performed by: FAMILY MEDICINE

## 2018-01-01 PROCEDURE — 93005 ELECTROCARDIOGRAM TRACING: CPT

## 2018-01-01 PROCEDURE — 2580000003 HC RX 258: Performed by: EMERGENCY MEDICINE

## 2018-01-01 PROCEDURE — 7100000001 HC PACU RECOVERY - ADDTL 15 MIN: Performed by: SPECIALIST

## 2018-01-01 PROCEDURE — G8427 DOCREV CUR MEDS BY ELIG CLIN: HCPCS | Performed by: FAMILY MEDICINE

## 2018-01-01 PROCEDURE — 86376 MICROSOMAL ANTIBODY EACH: CPT

## 2018-01-01 PROCEDURE — G8400 PT W/DXA NO RESULTS DOC: HCPCS | Performed by: FAMILY MEDICINE

## 2018-01-01 PROCEDURE — 93005 ELECTROCARDIOGRAM TRACING: CPT | Performed by: SPECIALIST

## 2018-01-01 PROCEDURE — 82553 CREATINE MB FRACTION: CPT

## 2018-01-01 PROCEDURE — 85730 THROMBOPLASTIN TIME PARTIAL: CPT

## 2018-01-01 PROCEDURE — 86778 TOXOPLASMA ANTIBODY IGM: CPT

## 2018-01-01 PROCEDURE — 2500000003 HC RX 250 WO HCPCS: Performed by: SPECIALIST

## 2018-01-01 PROCEDURE — 86022 PLATELET ANTIBODIES: CPT

## 2018-01-01 PROCEDURE — 83880 ASSAY OF NATRIURETIC PEPTIDE: CPT

## 2018-01-01 PROCEDURE — 3700000001 HC ADD 15 MINUTES (ANESTHESIA): Performed by: SPECIALIST

## 2018-01-01 PROCEDURE — 36561 INSERT TUNNELED CV CATH: CPT | Performed by: SURGERY

## 2018-01-01 PROCEDURE — 99284 EMERGENCY DEPT VISIT MOD MDM: CPT

## 2018-01-01 PROCEDURE — 96372 THER/PROPH/DIAG INJ SC/IM: CPT

## 2018-01-01 PROCEDURE — 83550 IRON BINDING TEST: CPT

## 2018-01-01 PROCEDURE — 86706 HEP B SURFACE ANTIBODY: CPT

## 2018-01-01 PROCEDURE — 86880 COOMBS TEST DIRECT: CPT

## 2018-01-01 PROCEDURE — 86038 ANTINUCLEAR ANTIBODIES: CPT

## 2018-01-01 PROCEDURE — 76881 US COMPL JOINT R-T W/IMG: CPT

## 2018-01-01 PROCEDURE — 87081 CULTURE SCREEN ONLY: CPT

## 2018-01-01 PROCEDURE — 36415 COLL VENOUS BLD VENIPUNCTURE: CPT

## 2018-01-01 PROCEDURE — 83735 ASSAY OF MAGNESIUM: CPT

## 2018-01-01 PROCEDURE — 6360000002 HC RX W HCPCS: Performed by: ANESTHESIOLOGY

## 2018-01-01 PROCEDURE — 90935 HEMODIALYSIS ONE EVALUATION: CPT

## 2018-01-01 PROCEDURE — 84100 ASSAY OF PHOSPHORUS: CPT

## 2018-01-01 PROCEDURE — 85384 FIBRINOGEN ACTIVITY: CPT

## 2018-01-01 PROCEDURE — 2580000003 HC RX 258: Performed by: STUDENT IN AN ORGANIZED HEALTH CARE EDUCATION/TRAINING PROGRAM

## 2018-01-01 PROCEDURE — 82248 BILIRUBIN DIRECT: CPT

## 2018-01-01 PROCEDURE — 86777 TOXOPLASMA ANTIBODY: CPT

## 2018-01-01 PROCEDURE — 82390 ASSAY OF CERULOPLASMIN: CPT

## 2018-01-01 PROCEDURE — 7100000010 HC PHASE II RECOVERY - FIRST 15 MIN: Performed by: SPECIALIST

## 2018-01-01 PROCEDURE — 93976 VASCULAR STUDY: CPT

## 2018-01-01 PROCEDURE — 86900 BLOOD TYPING SEROLOGIC ABO: CPT

## 2018-01-01 PROCEDURE — 2500000003 HC RX 250 WO HCPCS: Performed by: NURSE ANESTHETIST, CERTIFIED REGISTERED

## 2018-01-01 PROCEDURE — 85027 COMPLETE CBC AUTOMATED: CPT

## 2018-01-01 PROCEDURE — 99285 EMERGENCY DEPT VISIT HI MDM: CPT

## 2018-01-01 PROCEDURE — 6360000002 HC RX W HCPCS: Performed by: HOSPITALIST

## 2018-01-01 PROCEDURE — 1101F PT FALLS ASSESS-DOCD LE1/YR: CPT | Performed by: FAMILY MEDICINE

## 2018-01-01 PROCEDURE — 6370000000 HC RX 637 (ALT 250 FOR IP): Performed by: STUDENT IN AN ORGANIZED HEALTH CARE EDUCATION/TRAINING PROGRAM

## 2018-01-01 PROCEDURE — 82728 ASSAY OF FERRITIN: CPT

## 2018-01-01 PROCEDURE — 83540 ASSAY OF IRON: CPT

## 2018-01-01 PROCEDURE — 83036 HEMOGLOBIN GLYCOSYLATED A1C: CPT

## 2018-01-01 PROCEDURE — 86255 FLUORESCENT ANTIBODY SCREEN: CPT

## 2018-01-01 PROCEDURE — 84550 ASSAY OF BLOOD/URIC ACID: CPT

## 2018-01-01 PROCEDURE — G8484 FLU IMMUNIZE NO ADMIN: HCPCS | Performed by: FAMILY MEDICINE

## 2018-01-01 PROCEDURE — 3600000013 HC SURGERY LEVEL 3 ADDTL 15MIN: Performed by: SPECIALIST

## 2018-01-01 PROCEDURE — 82550 ASSAY OF CK (CPK): CPT

## 2018-01-01 PROCEDURE — 36430 TRANSFUSION BLD/BLD COMPNT: CPT

## 2018-01-01 PROCEDURE — 99222 1ST HOSP IP/OBS MODERATE 55: CPT | Performed by: SURGERY

## 2018-01-01 PROCEDURE — 7100000000 HC PACU RECOVERY - FIRST 15 MIN: Performed by: SPECIALIST

## 2018-01-01 PROCEDURE — 87205 SMEAR GRAM STAIN: CPT

## 2018-01-01 PROCEDURE — 2580000003 HC RX 258: Performed by: SPECIALIST

## 2018-01-01 PROCEDURE — 86665 EPSTEIN-BARR CAPSID VCA: CPT

## 2018-01-01 PROCEDURE — G0438 PPPS, INITIAL VISIT: HCPCS | Performed by: FAMILY MEDICINE

## 2018-01-01 PROCEDURE — 6360000002 HC RX W HCPCS: Performed by: NURSE ANESTHETIST, CERTIFIED REGISTERED

## 2018-01-01 PROCEDURE — 93005 ELECTROCARDIOGRAM TRACING: CPT | Performed by: EMERGENCY MEDICINE

## 2018-01-01 PROCEDURE — 86645 CMV ANTIBODY IGM: CPT

## 2018-01-01 PROCEDURE — 6370000000 HC RX 637 (ALT 250 FOR IP): Performed by: INTERNAL MEDICINE

## 2018-01-01 PROCEDURE — 85378 FIBRIN DEGRADE SEMIQUANT: CPT

## 2018-01-01 PROCEDURE — 2500000003 HC RX 250 WO HCPCS: Performed by: EMERGENCY MEDICINE

## 2018-01-01 PROCEDURE — 86787 VARICELLA-ZOSTER ANTIBODY: CPT

## 2018-01-01 PROCEDURE — 80076 HEPATIC FUNCTION PANEL: CPT

## 2018-01-01 PROCEDURE — 87070 CULTURE OTHR SPECIMN AEROBIC: CPT

## 2018-01-01 PROCEDURE — 70450 CT HEAD/BRAIN W/O DYE: CPT

## 2018-01-01 PROCEDURE — 83615 LACTATE (LD) (LDH) ENZYME: CPT

## 2018-01-01 PROCEDURE — 2780000010 HC IMPLANT OTHER: Performed by: SPECIALIST

## 2018-01-01 PROCEDURE — 84443 ASSAY THYROID STIM HORMONE: CPT

## 2018-01-01 PROCEDURE — P9016 RBC LEUKOCYTES REDUCED: HCPCS

## 2018-01-01 PROCEDURE — 82607 VITAMIN B-12: CPT

## 2018-01-01 PROCEDURE — 96365 THER/PROPH/DIAG IV INF INIT: CPT

## 2018-01-01 RX ORDER — GLYCOPYRROLATE 1 MG/5 ML
SYRINGE (ML) INTRAVENOUS PRN
Status: DISCONTINUED | OUTPATIENT
Start: 2018-01-01 | End: 2018-01-01 | Stop reason: SDUPTHER

## 2018-01-01 RX ORDER — LIDOCAINE HYDROCHLORIDE 10 MG/ML
INJECTION, SOLUTION INFILTRATION; PERINEURAL PRN
Status: DISCONTINUED | OUTPATIENT
Start: 2018-01-01 | End: 2018-01-01 | Stop reason: HOSPADM

## 2018-01-01 RX ORDER — LORAZEPAM 2 MG/ML
0.5 INJECTION INTRAMUSCULAR ONCE
Status: COMPLETED | OUTPATIENT
Start: 2018-01-01 | End: 2018-01-01

## 2018-01-01 RX ORDER — SODIUM CHLORIDE 0.9 % (FLUSH) 0.9 %
10 SYRINGE (ML) INJECTION PRN
Status: DISCONTINUED | OUTPATIENT
Start: 2018-01-01 | End: 2018-01-01 | Stop reason: HOSPADM

## 2018-01-01 RX ORDER — ACETAMINOPHEN 325 MG/1
650 TABLET ORAL ONCE
Status: COMPLETED | OUTPATIENT
Start: 2018-01-01 | End: 2018-01-01

## 2018-01-01 RX ORDER — LORAZEPAM 2 MG/ML
1 INJECTION INTRAMUSCULAR
Status: DISCONTINUED | OUTPATIENT
Start: 2018-01-01 | End: 2018-01-01 | Stop reason: HOSPADM

## 2018-01-01 RX ORDER — KETOCONAZOLE 20 MG/G
CREAM TOPICAL
Qty: 60 G | Refills: 3 | Status: SHIPPED | OUTPATIENT
Start: 2018-01-01 | End: 2018-01-01 | Stop reason: ALTCHOICE

## 2018-01-01 RX ORDER — KETOCONAZOLE 20 MG/G
CREAM TOPICAL
Qty: 60 G | Refills: 1 | Status: SHIPPED | OUTPATIENT
Start: 2018-01-01 | End: 2018-01-01 | Stop reason: SDUPTHER

## 2018-01-01 RX ORDER — ROCURONIUM BROMIDE 10 MG/ML
INJECTION, SOLUTION INTRAVENOUS PRN
Status: DISCONTINUED | OUTPATIENT
Start: 2018-01-01 | End: 2018-01-01 | Stop reason: SDUPTHER

## 2018-01-01 RX ORDER — FENTANYL CITRATE 50 UG/ML
INJECTION, SOLUTION INTRAMUSCULAR; INTRAVENOUS PRN
Status: DISCONTINUED | OUTPATIENT
Start: 2018-01-01 | End: 2018-01-01 | Stop reason: SDUPTHER

## 2018-01-01 RX ORDER — 0.9 % SODIUM CHLORIDE 0.9 %
250 INTRAVENOUS SOLUTION INTRAVENOUS ONCE
Status: DISCONTINUED | OUTPATIENT
Start: 2018-01-01 | End: 2018-01-01

## 2018-01-01 RX ORDER — 0.9 % SODIUM CHLORIDE 0.9 %
1000 INTRAVENOUS SOLUTION INTRAVENOUS ONCE
Status: DISCONTINUED | OUTPATIENT
Start: 2018-01-01 | End: 2018-01-01

## 2018-01-01 RX ORDER — THIAMINE HYDROCHLORIDE 100 MG/ML
100 INJECTION, SOLUTION INTRAMUSCULAR; INTRAVENOUS DAILY
Status: DISCONTINUED | OUTPATIENT
Start: 2018-01-01 | End: 2018-01-01 | Stop reason: CLARIF

## 2018-01-01 RX ORDER — HYDROMORPHONE HCL 110MG/55ML
0.25 PATIENT CONTROLLED ANALGESIA SYRINGE INTRAVENOUS EVERY 5 MIN PRN
Status: DISCONTINUED | OUTPATIENT
Start: 2018-01-01 | End: 2018-01-01 | Stop reason: HOSPADM

## 2018-01-01 RX ORDER — VENLAFAXINE HYDROCHLORIDE 150 MG/1
150 CAPSULE, EXTENDED RELEASE ORAL DAILY
Qty: 90 CAPSULE | Refills: 0 | Status: SHIPPED | OUTPATIENT
Start: 2018-01-01

## 2018-01-01 RX ORDER — SODIUM CHLORIDE 0.9 % (FLUSH) 0.9 %
10 SYRINGE (ML) INJECTION EVERY 12 HOURS SCHEDULED
Status: DISCONTINUED | OUTPATIENT
Start: 2018-01-01 | End: 2018-01-01 | Stop reason: HOSPADM

## 2018-01-01 RX ORDER — LEVOTHYROXINE SODIUM 0.07 MG/1
TABLET ORAL
Qty: 90 TABLET | Refills: 3 | Status: SHIPPED | OUTPATIENT
Start: 2018-01-01

## 2018-01-01 RX ORDER — PROPOFOL 10 MG/ML
INJECTION, EMULSION INTRAVENOUS PRN
Status: DISCONTINUED | OUTPATIENT
Start: 2018-01-01 | End: 2018-01-01 | Stop reason: SDUPTHER

## 2018-01-01 RX ORDER — GABAPENTIN 100 MG/1
100 CAPSULE ORAL NIGHTLY
Qty: 90 CAPSULE | Status: SHIPPED | COMMUNITY
Start: 2018-01-01

## 2018-01-01 RX ORDER — NICOTINE POLACRILEX 4 MG
15 LOZENGE BUCCAL PRN
Status: DISCONTINUED | OUTPATIENT
Start: 2018-01-01 | End: 2018-01-01

## 2018-01-01 RX ORDER — SODIUM CHLORIDE 9 MG/ML
INJECTION, SOLUTION INTRAVENOUS CONTINUOUS
Status: DISCONTINUED | OUTPATIENT
Start: 2018-01-01 | End: 2018-01-01 | Stop reason: HOSPADM

## 2018-01-01 RX ORDER — 0.9 % SODIUM CHLORIDE 0.9 %
1000 INTRAVENOUS SOLUTION INTRAVENOUS ONCE
Status: COMPLETED | OUTPATIENT
Start: 2018-01-01 | End: 2018-01-01

## 2018-01-01 RX ORDER — OXYCODONE HYDROCHLORIDE AND ACETAMINOPHEN 5; 325 MG/1; MG/1
1 TABLET ORAL EVERY 6 HOURS PRN
Qty: 28 TABLET | Refills: 0 | Status: SHIPPED | OUTPATIENT
Start: 2018-01-01 | End: 2018-01-01

## 2018-01-01 RX ORDER — LORAZEPAM 2 MG/ML
1 INJECTION INTRAMUSCULAR ONCE
Status: COMPLETED | OUTPATIENT
Start: 2018-01-01 | End: 2018-01-01

## 2018-01-01 RX ORDER — ONDANSETRON 2 MG/ML
4 INJECTION INTRAMUSCULAR; INTRAVENOUS
Status: COMPLETED | OUTPATIENT
Start: 2018-01-01 | End: 2018-01-01

## 2018-01-01 RX ORDER — LORAZEPAM 2 MG/ML
INJECTION INTRAMUSCULAR
Status: DISCONTINUED
Start: 2018-01-01 | End: 2018-01-01

## 2018-01-01 RX ORDER — NEOSTIGMINE METHYLSULFATE 0.5 MG/ML
INJECTION, SOLUTION INTRAVENOUS PRN
Status: DISCONTINUED | OUTPATIENT
Start: 2018-01-01 | End: 2018-01-01 | Stop reason: SDUPTHER

## 2018-01-01 RX ORDER — LABETALOL HYDROCHLORIDE 5 MG/ML
INJECTION, SOLUTION INTRAVENOUS PRN
Status: DISCONTINUED | OUTPATIENT
Start: 2018-01-01 | End: 2018-01-01 | Stop reason: SDUPTHER

## 2018-01-01 RX ORDER — DOBUTAMINE HYDROCHLORIDE 400 MG/100ML
2.5 INJECTION INTRAVENOUS CONTINUOUS
Status: DISCONTINUED | OUTPATIENT
Start: 2018-01-01 | End: 2018-01-01

## 2018-01-01 RX ORDER — DEXTROSE MONOHYDRATE 25 G/50ML
25 INJECTION, SOLUTION INTRAVENOUS ONCE
Status: COMPLETED | OUTPATIENT
Start: 2018-01-01 | End: 2018-01-01

## 2018-01-01 RX ORDER — DEXAMETHASONE SODIUM PHOSPHATE 10 MG/ML
INJECTION INTRAMUSCULAR; INTRAVENOUS PRN
Status: DISCONTINUED | OUTPATIENT
Start: 2018-01-01 | End: 2018-01-01 | Stop reason: SDUPTHER

## 2018-01-01 RX ORDER — SODIUM CHLORIDE 9 MG/ML
INJECTION, SOLUTION INTRAVENOUS CONTINUOUS
Status: CANCELLED | OUTPATIENT
Start: 2018-01-01

## 2018-01-01 RX ORDER — HEPARIN SODIUM 1000 [USP'U]/ML
INJECTION, SOLUTION INTRAVENOUS; SUBCUTANEOUS PRN
Status: DISCONTINUED | OUTPATIENT
Start: 2018-01-01 | End: 2018-01-01 | Stop reason: HOSPADM

## 2018-01-01 RX ORDER — LEVOTHYROXINE SODIUM 0.07 MG/1
75 TABLET ORAL DAILY
Status: DISCONTINUED | OUTPATIENT
Start: 2018-01-01 | End: 2018-01-01

## 2018-01-01 RX ORDER — DEXTROSE MONOHYDRATE 50 MG/ML
100 INJECTION, SOLUTION INTRAVENOUS PRN
Status: DISCONTINUED | OUTPATIENT
Start: 2018-01-01 | End: 2018-01-01

## 2018-01-01 RX ORDER — MAGNESIUM HYDROXIDE 1200 MG/15ML
LIQUID ORAL
Qty: 100 ML | Refills: 0 | Status: SHIPPED | OUTPATIENT
Start: 2018-01-01 | End: 2018-01-01

## 2018-01-01 RX ORDER — SODIUM CHLORIDE 0.9 % (FLUSH) 0.9 %
10 SYRINGE (ML) INJECTION PRN
Status: CANCELLED | OUTPATIENT
Start: 2018-01-01

## 2018-01-01 RX ORDER — HALOPERIDOL 5 MG/ML
5 INJECTION INTRAMUSCULAR ONCE
Status: COMPLETED | OUTPATIENT
Start: 2018-01-01 | End: 2018-01-01

## 2018-01-01 RX ORDER — METHYLPREDNISOLONE SODIUM SUCCINATE 125 MG/2ML
125 INJECTION, POWDER, LYOPHILIZED, FOR SOLUTION INTRAMUSCULAR; INTRAVENOUS ONCE
Status: COMPLETED | OUTPATIENT
Start: 2018-01-01 | End: 2018-01-01

## 2018-01-01 RX ORDER — ALLOPURINOL 100 MG/1
100 TABLET ORAL DAILY
Qty: 90 TABLET | Refills: 3 | Status: SHIPPED | OUTPATIENT
Start: 2018-01-01

## 2018-01-01 RX ORDER — RANITIDINE 300 MG/1
TABLET ORAL
Qty: 180 TABLET | Refills: 3 | Status: SHIPPED | OUTPATIENT
Start: 2018-01-01

## 2018-01-01 RX ORDER — DIPHENHYDRAMINE HYDROCHLORIDE 50 MG/ML
12.5 INJECTION INTRAMUSCULAR; INTRAVENOUS ONCE
Status: COMPLETED | OUTPATIENT
Start: 2018-01-01 | End: 2018-01-01

## 2018-01-01 RX ORDER — FUROSEMIDE 20 MG/1
20 TABLET ORAL 2 TIMES DAILY
Qty: 14 TABLET | Refills: 0 | Status: SHIPPED | OUTPATIENT
Start: 2018-01-01

## 2018-01-01 RX ORDER — GLYCOPYRROLATE 0.2 MG/ML
0.2 INJECTION INTRAMUSCULAR; INTRAVENOUS
Status: DISCONTINUED | OUTPATIENT
Start: 2018-01-01 | End: 2018-01-01 | Stop reason: HOSPADM

## 2018-01-01 RX ORDER — GABAPENTIN 100 MG/1
1 CAPSULE ORAL 2 TIMES DAILY
COMMUNITY
End: 2018-01-01 | Stop reason: DRUGHIGH

## 2018-01-01 RX ORDER — METHYLPREDNISOLONE SODIUM SUCCINATE 125 MG/2ML
80 INJECTION, POWDER, LYOPHILIZED, FOR SOLUTION INTRAMUSCULAR; INTRAVENOUS EVERY 8 HOURS
Status: DISCONTINUED | OUTPATIENT
Start: 2018-01-01 | End: 2018-01-01

## 2018-01-01 RX ORDER — PROPOFOL 10 MG/ML
INJECTION, EMULSION INTRAVENOUS PRN
Status: DISCONTINUED | OUTPATIENT
Start: 2018-01-01 | End: 2018-01-01

## 2018-01-01 RX ORDER — DEXTROSE MONOHYDRATE 25 G/50ML
12.5 INJECTION, SOLUTION INTRAVENOUS PRN
Status: DISCONTINUED | OUTPATIENT
Start: 2018-01-01 | End: 2018-01-01

## 2018-01-01 RX ORDER — HYDROMORPHONE HCL 110MG/55ML
0.5 PATIENT CONTROLLED ANALGESIA SYRINGE INTRAVENOUS EVERY 5 MIN PRN
Status: DISCONTINUED | OUTPATIENT
Start: 2018-01-01 | End: 2018-01-01 | Stop reason: HOSPADM

## 2018-01-01 RX ORDER — SODIUM CHLORIDE 0.9 % (FLUSH) 0.9 %
10 SYRINGE (ML) INJECTION EVERY 12 HOURS SCHEDULED
Status: CANCELLED | OUTPATIENT
Start: 2018-01-01

## 2018-01-01 RX ORDER — METOPROLOL SUCCINATE 25 MG/1
25 TABLET, EXTENDED RELEASE ORAL NIGHTLY
COMMUNITY

## 2018-01-01 RX ORDER — LACTULOSE 10 G/15ML
20 SOLUTION ORAL 3 TIMES DAILY
Status: DISCONTINUED | OUTPATIENT
Start: 2018-01-01 | End: 2018-01-01

## 2018-01-01 RX ORDER — IPRATROPIUM BROMIDE AND ALBUTEROL SULFATE 2.5; .5 MG/3ML; MG/3ML
1 SOLUTION RESPIRATORY (INHALATION)
Status: DISCONTINUED | OUTPATIENT
Start: 2018-01-01 | End: 2018-01-01

## 2018-01-01 RX ORDER — CALCITRIOL 0.25 UG/1
0.25 CAPSULE, LIQUID FILLED ORAL DAILY
COMMUNITY

## 2018-01-01 RX ADMIN — ACETYLCYSTEINE 11180 MG: 200 INJECTION, SOLUTION INTRAVENOUS at 20:00

## 2018-01-01 RX ADMIN — IPRATROPIUM BROMIDE AND ALBUTEROL SULFATE 1 AMPULE: .5; 3 SOLUTION RESPIRATORY (INHALATION) at 05:38

## 2018-01-01 RX ADMIN — SODIUM CHLORIDE 1 MG/HR: 900 INJECTION, SOLUTION INTRAVENOUS at 13:01

## 2018-01-01 RX ADMIN — IPRATROPIUM BROMIDE AND ALBUTEROL SULFATE 1 AMPULE: .5; 3 SOLUTION RESPIRATORY (INHALATION) at 09:33

## 2018-01-01 RX ADMIN — Medication 10 ML: at 06:01

## 2018-01-01 RX ADMIN — METHYLPREDNISOLONE SODIUM SUCCINATE 80 MG: 125 INJECTION, POWDER, FOR SOLUTION INTRAMUSCULAR; INTRAVENOUS at 14:59

## 2018-01-01 RX ADMIN — VANCOMYCIN HYDROCHLORIDE 1500 MG: 10 INJECTION, POWDER, LYOPHILIZED, FOR SOLUTION INTRAVENOUS at 20:01

## 2018-01-01 RX ADMIN — DEXTROSE MONOHYDRATE 25 G: 25 INJECTION, SOLUTION INTRAVENOUS at 07:00

## 2018-01-01 RX ADMIN — FENTANYL CITRATE 50 MCG: 50 INJECTION, SOLUTION INTRAMUSCULAR; INTRAVENOUS at 09:25

## 2018-01-01 RX ADMIN — LORAZEPAM 0.5 MG: 2 INJECTION INTRAMUSCULAR; INTRAVENOUS at 14:58

## 2018-01-01 RX ADMIN — ONDANSETRON 4 MG: 2 INJECTION INTRAMUSCULAR; INTRAVENOUS at 09:41

## 2018-01-01 RX ADMIN — WATER 1 G: 1 INJECTION, SOLUTION INTRAVENOUS at 06:00

## 2018-01-01 RX ADMIN — LORAZEPAM 0.5 MG: 2 INJECTION INTRAMUSCULAR; INTRAVENOUS at 16:56

## 2018-01-01 RX ADMIN — METHYLPREDNISOLONE SODIUM SUCCINATE 80 MG: 125 INJECTION, POWDER, FOR SOLUTION INTRAMUSCULAR; INTRAVENOUS at 06:00

## 2018-01-01 RX ADMIN — LORAZEPAM 1 MG: 2 INJECTION INTRAMUSCULAR; INTRAVENOUS at 09:25

## 2018-01-01 RX ADMIN — SODIUM CHLORIDE 1000 ML: 9 INJECTION, SOLUTION INTRAVENOUS at 18:05

## 2018-01-01 RX ADMIN — ACETYLCYSTEINE 7460 MG: 200 INJECTION, SOLUTION INTRAVENOUS at 02:01

## 2018-01-01 RX ADMIN — LORAZEPAM 1 MG: 2 INJECTION INTRAMUSCULAR; INTRAVENOUS at 14:24

## 2018-01-01 RX ADMIN — IPRATROPIUM BROMIDE AND ALBUTEROL SULFATE 1 AMPULE: .5; 3 SOLUTION RESPIRATORY (INHALATION) at 20:51

## 2018-01-01 RX ADMIN — WATER: 100 IRRIGANT IRRIGATION at 11:50

## 2018-01-01 RX ADMIN — PHYTONADIONE 1 MG: 1 INJECTION, EMULSION INTRAMUSCULAR; INTRAVENOUS; SUBCUTANEOUS at 09:15

## 2018-01-01 RX ADMIN — DEXTROSE MONOHYDRATE: 100 INJECTION, SOLUTION INTRAVENOUS at 08:52

## 2018-01-01 RX ADMIN — NEOSTIGMINE METHYLSULFATE 2 MG: 0.5 INJECTION INTRAVENOUS at 10:10

## 2018-01-01 RX ADMIN — WATER 1 G: 1 INJECTION, SOLUTION INTRAVENOUS at 20:41

## 2018-01-01 RX ADMIN — Medication 10 ML: at 00:03

## 2018-01-01 RX ADMIN — DOBUTAMINE HYDROCHLORIDE 2.5 MCG/KG/MIN: 400 INJECTION INTRAVENOUS at 20:29

## 2018-01-01 RX ADMIN — Medication 10 ML: at 14:25

## 2018-01-01 RX ADMIN — LIDOCAINE HYDROCHLORIDE 50 MG: 20 INJECTION, SOLUTION INTRAVENOUS at 07:51

## 2018-01-01 RX ADMIN — SODIUM CHLORIDE: 9 INJECTION, SOLUTION INTRAVENOUS at 10:00

## 2018-01-01 RX ADMIN — PHYTONADIONE 1 MG: 1 INJECTION, EMULSION INTRAMUSCULAR; INTRAVENOUS; SUBCUTANEOUS at 00:02

## 2018-01-01 RX ADMIN — SODIUM CHLORIDE 1000 ML: 9 INJECTION, SOLUTION INTRAVENOUS at 07:57

## 2018-01-01 RX ADMIN — ACETYLCYSTEINE 3720 MG: 200 INJECTION, SOLUTION INTRAVENOUS at 21:06

## 2018-01-01 RX ADMIN — Medication 0.4 MG: at 10:10

## 2018-01-01 RX ADMIN — THIAMINE HYDROCHLORIDE 100 MG: 100 INJECTION, SOLUTION INTRAMUSCULAR; INTRAVENOUS at 09:15

## 2018-01-01 RX ADMIN — DEXAMETHASONE SODIUM PHOSPHATE 10 MG: 10 INJECTION INTRAMUSCULAR; INTRAVENOUS at 07:57

## 2018-01-01 RX ADMIN — SODIUM CHLORIDE: 9 INJECTION, SOLUTION INTRAVENOUS at 07:34

## 2018-01-01 RX ADMIN — WATER: 100 IRRIGANT IRRIGATION at 00:02

## 2018-01-01 RX ADMIN — ACETAMINOPHEN 650 MG: 325 TABLET, FILM COATED ORAL at 21:39

## 2018-01-01 RX ADMIN — SODIUM BICARBONATE: 84 INJECTION, SOLUTION INTRAVENOUS at 23:45

## 2018-01-01 RX ADMIN — FENTANYL CITRATE 50 MCG: 50 INJECTION, SOLUTION INTRAMUSCULAR; INTRAVENOUS at 08:10

## 2018-01-01 RX ADMIN — Medication 10 ML: at 09:16

## 2018-01-01 RX ADMIN — GLUCAGON HYDROCHLORIDE 1 MG: KIT at 11:21

## 2018-01-01 RX ADMIN — SODIUM CHLORIDE: 9 INJECTION, SOLUTION INTRAVENOUS at 06:16

## 2018-01-01 RX ADMIN — METHYLPREDNISOLONE SODIUM SUCCINATE 125 MG: 125 INJECTION, POWDER, FOR SOLUTION INTRAMUSCULAR; INTRAVENOUS at 11:14

## 2018-01-01 RX ADMIN — LORAZEPAM 0.5 MG: 2 INJECTION INTRAMUSCULAR; INTRAVENOUS at 17:50

## 2018-01-01 RX ADMIN — FENTANYL CITRATE 50 MCG: 50 INJECTION, SOLUTION INTRAMUSCULAR; INTRAVENOUS at 07:50

## 2018-01-01 RX ADMIN — HALOPERIDOL LACTATE 5 MG: 5 INJECTION, SOLUTION INTRAMUSCULAR at 09:25

## 2018-01-01 RX ADMIN — PHYTONADIONE 1 MG: 10 INJECTION, EMULSION INTRAMUSCULAR; INTRAVENOUS; SUBCUTANEOUS at 11:27

## 2018-01-01 RX ADMIN — CEFAZOLIN SODIUM 2 G: 2 SOLUTION INTRAVENOUS at 07:36

## 2018-01-01 RX ADMIN — PROPOFOL 150 MG: 10 INJECTION, EMULSION INTRAVENOUS at 07:51

## 2018-01-01 RX ADMIN — ROCURONIUM BROMIDE 20 MG: 10 INJECTION, SOLUTION INTRAVENOUS at 08:14

## 2018-01-01 RX ADMIN — METHYLPREDNISOLONE SODIUM SUCCINATE 80 MG: 125 INJECTION, POWDER, FOR SOLUTION INTRAMUSCULAR; INTRAVENOUS at 20:41

## 2018-01-01 RX ADMIN — DIPHENHYDRAMINE HYDROCHLORIDE 12.5 MG: 50 INJECTION, SOLUTION INTRAMUSCULAR; INTRAVENOUS at 09:25

## 2018-01-01 RX ADMIN — FENTANYL CITRATE 50 MCG: 50 INJECTION, SOLUTION INTRAMUSCULAR; INTRAVENOUS at 07:57

## 2018-01-01 RX ADMIN — ROCURONIUM BROMIDE 30 MG: 10 INJECTION, SOLUTION INTRAVENOUS at 07:51

## 2018-01-01 RX ADMIN — SODIUM CHLORIDE 250 ML: 9 INJECTION, SOLUTION INTRAVENOUS at 01:56

## 2018-01-01 RX ADMIN — LABETALOL HYDROCHLORIDE 5 MG: 5 INJECTION, SOLUTION INTRAVENOUS at 08:49

## 2018-01-01 RX ADMIN — SODIUM CHLORIDE 6 MG/HR: 900 INJECTION, SOLUTION INTRAVENOUS at 05:39

## 2018-01-01 ASSESSMENT — PULMONARY FUNCTION TESTS
PIF_VALUE: 28
PIF_VALUE: 24
PIF_VALUE: 27
PIF_VALUE: 24
PIF_VALUE: 24
PIF_VALUE: 23
PIF_VALUE: 24
PIF_VALUE: 23
PIF_VALUE: 24
PIF_VALUE: 23
PIF_VALUE: 24
PIF_VALUE: 0
PIF_VALUE: 23
PIF_VALUE: 24
PIF_VALUE: 24
PIF_VALUE: 0
PIF_VALUE: 25
PIF_VALUE: 19
PIF_VALUE: 24
PIF_VALUE: 25
PIF_VALUE: 24
PIF_VALUE: 24
PIF_VALUE: 25
PIF_VALUE: 26
PIF_VALUE: 24
PIF_VALUE: 24
PIF_VALUE: 23
PIF_VALUE: 23
PIF_VALUE: 24
PIF_VALUE: 24
PIF_VALUE: 16
PIF_VALUE: 24
PIF_VALUE: 24
PIF_VALUE: 23
PIF_VALUE: 33
PIF_VALUE: 0
PIF_VALUE: 24
PIF_VALUE: 26
PIF_VALUE: 24
PIF_VALUE: 26
PIF_VALUE: 0
PIF_VALUE: 4
PIF_VALUE: 25
PIF_VALUE: 24
PIF_VALUE: 26
PIF_VALUE: 12
PIF_VALUE: 24
PIF_VALUE: 24
PIF_VALUE: 25
PIF_VALUE: 24
PIF_VALUE: 1
PIF_VALUE: 13
PIF_VALUE: 0
PIF_VALUE: 4
PIF_VALUE: 24
PIF_VALUE: 25
PIF_VALUE: 0
PIF_VALUE: 24
PIF_VALUE: 24
PIF_VALUE: 29
PIF_VALUE: 24
PIF_VALUE: 13
PIF_VALUE: 25
PIF_VALUE: 24
PIF_VALUE: 23
PIF_VALUE: 26
PIF_VALUE: 24
PIF_VALUE: 27
PIF_VALUE: 24
PIF_VALUE: 25
PIF_VALUE: 5
PIF_VALUE: 23
PIF_VALUE: 25
PIF_VALUE: 24
PIF_VALUE: 24
PIF_VALUE: 25
PIF_VALUE: 24
PIF_VALUE: 24
PIF_VALUE: 18
PIF_VALUE: 26
PIF_VALUE: 22
PIF_VALUE: 24
PIF_VALUE: 25
PIF_VALUE: 13
PIF_VALUE: 22
PIF_VALUE: 24
PIF_VALUE: 24
PIF_VALUE: 23
PIF_VALUE: 18
PIF_VALUE: 25
PIF_VALUE: 19
PIF_VALUE: 29
PIF_VALUE: 24
PIF_VALUE: 28
PIF_VALUE: 25
PIF_VALUE: 15
PIF_VALUE: 24
PIF_VALUE: 25
PIF_VALUE: 25
PIF_VALUE: 24
PIF_VALUE: 23
PIF_VALUE: 18
PIF_VALUE: 24
PIF_VALUE: 25
PIF_VALUE: 23
PIF_VALUE: 25
PIF_VALUE: 24
PIF_VALUE: 13
PIF_VALUE: 24
PIF_VALUE: 25
PIF_VALUE: 1
PIF_VALUE: 24
PIF_VALUE: 24
PIF_VALUE: 26
PIF_VALUE: 24
PIF_VALUE: 24
PIF_VALUE: 25
PIF_VALUE: 24
PIF_VALUE: 24
PIF_VALUE: 21
PIF_VALUE: 24
PIF_VALUE: 25
PIF_VALUE: 24
PIF_VALUE: 18
PIF_VALUE: 24
PIF_VALUE: 24
PIF_VALUE: 25
PIF_VALUE: 23
PIF_VALUE: 24
PIF_VALUE: 1
PIF_VALUE: 25
PIF_VALUE: 24
PIF_VALUE: 25
PIF_VALUE: 1
PIF_VALUE: 22
PIF_VALUE: 25
PIF_VALUE: 17
PIF_VALUE: 20
PIF_VALUE: 25
PIF_VALUE: 39
PIF_VALUE: 24
PIF_VALUE: 24
PIF_VALUE: 1
PIF_VALUE: 24
PIF_VALUE: 24
PIF_VALUE: 25
PIF_VALUE: 19
PIF_VALUE: 0
PIF_VALUE: 0

## 2018-01-01 ASSESSMENT — PAIN SCALES - GENERAL
PAINLEVEL_OUTOF10: 0
PAINLEVEL_OUTOF10: 10
PAINLEVEL_OUTOF10: 0
PAINLEVEL_OUTOF10: 7
PAINLEVEL_OUTOF10: 0

## 2018-01-01 ASSESSMENT — ENCOUNTER SYMPTOMS
COUGH: 1
HEARTBURN: 0
DIARRHEA: 0
BLOOD IN STOOL: 0
DIARRHEA: 0
VOMITING: 0
HEMOPTYSIS: 0
COUGH: 0
HEMOPTYSIS: 0
COUGH: 0
ABDOMINAL DISTENTION: 0
CONSTIPATION: 1
WHEEZING: 0
WHEEZING: 0
BACK PAIN: 0
HEARTBURN: 0
BACK PAIN: 0
SHORTNESS OF BREATH: 1
ABDOMINAL PAIN: 1
NAUSEA: 0
BLURRED VISION: 0
COUGH: 0
SHORTNESS OF BREATH: 1
BLOOD IN STOOL: 0
CONSTIPATION: 0
BLURRED VISION: 0

## 2018-01-01 ASSESSMENT — PAIN DESCRIPTION - ONSET: ONSET: ON-GOING

## 2018-01-01 ASSESSMENT — PAIN DESCRIPTION - DESCRIPTORS: DESCRIPTORS: CONSTANT

## 2018-01-01 ASSESSMENT — LIFESTYLE VARIABLES: HOW OFTEN DO YOU HAVE A DRINK CONTAINING ALCOHOL: 0

## 2018-01-01 ASSESSMENT — PAIN DESCRIPTION - PAIN TYPE
TYPE: SURGICAL PAIN
TYPE: CHRONIC PAIN
TYPE: SURGICAL PAIN

## 2018-01-01 ASSESSMENT — PAIN DESCRIPTION - ORIENTATION: ORIENTATION: RIGHT

## 2018-01-01 ASSESSMENT — PAIN SCALES - WONG BAKER: WONGBAKER_NUMERICALRESPONSE: 4

## 2018-01-01 ASSESSMENT — PAIN DESCRIPTION - LOCATION: LOCATION: GENERALIZED;SHOULDER;NECK

## 2018-01-01 ASSESSMENT — PAIN DESCRIPTION - PROGRESSION: CLINICAL_PROGRESSION: GRADUALLY WORSENING

## 2018-01-01 ASSESSMENT — PAIN DESCRIPTION - FREQUENCY: FREQUENCY: CONTINUOUS

## 2018-01-01 ASSESSMENT — PAIN - FUNCTIONAL ASSESSMENT: PAIN_FUNCTIONAL_ASSESSMENT: 0-10

## 2018-01-01 ASSESSMENT — PATIENT HEALTH QUESTIONNAIRE - PHQ9
SUM OF ALL RESPONSES TO PHQ QUESTIONS 1-9: 4
SUM OF ALL RESPONSES TO PHQ QUESTIONS 1-9: 10

## 2018-01-01 ASSESSMENT — ANXIETY QUESTIONNAIRES: GAD7 TOTAL SCORE: 2

## 2018-03-14 NOTE — OP NOTE
Katie Hinds  1940      DATE OF PROCEDURE: 3/14/2018     SURGEON: Dr. Card Arriola: Dr. Thao Smith DIAGNOSIS: Chronic renal failure     POSTOPERATIVE DIAGNOSIS: Same    OPERATION: Transposition of left brachiocephalic arteriovenous fistula      ANESTHESIA: General     ESTIMATED BLOOD LOSS: less than 50 ml     COMPLICATIONS: None    DESCRIPTION OF PROCEDURE: The patient was identified and the procedure was confirmed. The left arm was prepped and draped in the usual sterile fashion. Lidocaine 1% mixed with 0.25% Marcaine was used for local anesthesia. A longitudinal skin incision was made in the upper arm over the cephalic vein fistula and carried down through the subcutaneous tissue. The cephalic vein was identified and dissected free from the surrounding tissues and branches were divided between silk ties. The medial cutaneous nerve of the forearm was crossing the fistula and preventing superficialization. It was divided sharply and reattached under the fistula using 6-0 prolene. Laterally, the subcutaneous tissue was incised and approximated under the vein to elevate it using a running Vicryl suture. Excess adipose tissue was excised under the skin. Hemostasis was obtained and the incision was irrigated with saline solution. Lidocaine 1% mixed with 0.25% Marcaine was used for local anesthesia. The incision was approximated with Monocryl sutures and skin adhesive was applied over the incision in the operating room. Needle, sponge and instrument counts were reported as correct x2. The patient tolerated the procedure and was transferred to the recovery area in satisfactory condition. Dr. Aftab Moura was scrubbed and present for the entire procedure.     Electronically signed by Meryle Gower, MD on 3/14/18 at 10:23 AM

## 2018-03-14 NOTE — ANESTHESIA PRE PROCEDURE
10 yrs ago.  BREAST SURGERY      lt. Bx    COLONOSCOPY  1/30/13    DR. Carla Cabezas ELBOW SURGERY      part of pin left in    ENDOSCOPY, COLON, DIAGNOSTIC      ESOPHAGUS SURGERY      x2    EYE SURGERY      bilateral cat    FOOT SURGERY      HYSTERECTOMY      KNEE ARTHROPLASTY Right 8-    KNEE ARTHROSCOPY Right 5 7 15    medial and lateral menisecomty, synovectomy, chondroplasty    OTHER SURGICAL HISTORY      hamstring surgery    SHOULDER SURGERY Right     x2  rotator cuff tear repair       Social History:    Social History   Substance Use Topics    Smoking status: Never Smoker    Smokeless tobacco: Never Used    Alcohol use No                                Counseling given: Not Answered      Vital Signs (Current):   Vitals:    03/14/18 0541   BP: (!) 154/79   Pulse: 98   Resp: 18   Temp: 98.1 °F (36.7 °C)   TempSrc: Temporal   SpO2: 97%   Weight: 167 lb (75.8 kg)   Height: 5' 1\" (1.549 m)                                              BP Readings from Last 3 Encounters:   03/14/18 (!) 154/79   11/28/17 122/72   11/07/17 118/68       NPO Status: Time of last liquid consumption: 2300                        Time of last solid consumption: 0600                        Date of last liquid consumption: 03/13/18                        Date of last solid food consumption: 03/13/18    BMI:   Wt Readings from Last 3 Encounters:   03/14/18 167 lb (75.8 kg)   03/09/18 167 lb (75.8 kg)   11/28/17 166 lb (75.3 kg)     Body mass index is 31.55 kg/m².     CBC:   Lab Results   Component Value Date    WBC 5.2 03/14/2018    RBC 3.56 03/14/2018    HGB 10.8 03/14/2018    HCT 32.8 03/14/2018    MCV 92.1 03/14/2018    RDW 12.6 03/14/2018     03/14/2018       CMP:   Lab Results   Component Value Date     10/27/2017    K 3.9 10/27/2017     10/27/2017    CO2 26 10/27/2017    BUN 42 10/27/2017    CREATININE 2.8 10/27/2017    GFRAA 20 10/27/2017    LABGLOM 16 10/27/2017    GLUCOSE 117 10/27/2017

## 2018-03-14 NOTE — H&P
03/14/18   0600   WBC  5.2   HGB  10.8*   HCT  32.8*   PLT  214     BMP  Recent Labs      03/14/18   0600   NA  141   K  3.9   CL  100   CO2  24   BUN  45*   CREATININE  2.9*   CALCIUM  10.3*     Liver Function  No results for input(s): AMYLASE, LIPASE, BILITOT, BILIDIR, AST, ALT, ALKPHOS, PROT, LABALBU in the last 72 hours. No results for input(s): LACTATE in the last 72 hours. Recent Labs      03/14/18   0600   INR  1.0       RADIOLOGY  No results found.       ASSESSMENT:  65 y.o. female with LUE fistula for transposition today    PLAN:  OR for fistula transposition    Electronically signed by Jaylen Garcia MD on 3/14/18 at 7:22 AM

## 2018-06-12 PROBLEM — M1A.0490 CHRONIC GOUT OF HAND: Status: ACTIVE | Noted: 2018-01-01

## 2018-06-12 PROBLEM — E79.0 HYPERURICEMIA: Status: ACTIVE | Noted: 2018-01-01

## 2018-06-25 NOTE — PROGRESS NOTES
HPI:  Patient comes in today for   Chief Complaint   Patient presents with    Wound Check     pt had surgery to left buttock aprox 4 years ago pt states \"it's leaking from where the scar is\"    . Review of Systems  Review of Systems   Constitutional: Negative for chills, fever and weight loss. HENT: Negative for hearing loss and tinnitus. Eyes: Negative for blurred vision. Respiratory: Positive for shortness of breath (when going up and down steps. ). Negative for cough and hemoptysis. Cardiovascular: Negative for chest pain. Gastrointestinal: Negative for heartburn. Genitourinary: Negative for dysuria. Musculoskeletal: Positive for myalgias. Negative for back pain. Left Ischium opening and draining purulent material.   Skin: Negative for rash. Neurological: Negative for dizziness. Psychiatric/Behavioral: Negative for depression. PE:  VS:  /80   Pulse 106   Temp 97.8 °F (36.6 °C) (Oral)   Resp 16   Ht 5' (1.524 m)   Wt 170 lb (77.1 kg)   SpO2 97%   BMI 33.20 kg/m²   Physical Exam   Constitutional: She appears well-developed and well-nourished. HENT:   Head: Normocephalic. Eyes: Pupils are equal, round, and reactive to light. Neck: Normal range of motion. No tracheal deviation present. No thyromegaly present. Cardiovascular: Normal rate. Pulmonary/Chest: Effort normal.   Abdominal: Soft. She exhibits no distension. Musculoskeletal: Normal range of motion. Left hip abscess. I/D today. Neurological: She is alert. Skin: Skin is warm. No erythema. Psychiatric: She has a normal mood and affect.        OPERATIVE NOTE    DATE OF PROCEDURE: 6/25/18     SURGEON: Shabnam Eaton    ASSISTANT: Devyn Brandt    PREOPERATIVE DIAGNOSIS: Abscess Left abscess    POSTOPERATIVE DIAGNOSIS: Abscess Left buttock    OPERATION: Incision drainage left buttock abscess    ANESTHESIA: obtained by infiltration using none    ESTIMATED BLOOD LOSS: 2 ml     COMPLICATIONS: None

## 2018-11-12 NOTE — PROGRESS NOTES
Medicare Annual Wellness Visit  Name: Bob Stern Date: 2018   MRN: [id number] Sex: Female   Age: 77 y.o. Ethnicity: Non-/Non    : 1940 Race: Wendy Hinds is here for Medicare AWV    Screenings for behavioral, psychosocial and functional/safety risks, and cognitive dysfunction are all negative except as indicated below. These results, as well as other patient data from the [address] form, are documented in Flowsheets linked to this Encounter. Allergies   Allergen Reactions    Pcn [Penicillins] Hives and Swelling     At injection site     Prior to Visit Medications    Medication Sig Taking? Authorizing Provider   venlafaxine (EFFEXOR XR) 150 MG extended release capsule TAKE 1 CAPSULE BY MOUTH  DAILY Yes Nathanael Bustillo DO   mupirocin (BACTROBAN) 2 % ointment Apply topically 2 times daily. Yes Lexi Caballero APRN - CNP   levothyroxine (SYNTHROID) 75 MCG tablet Take 1 tablet by mouth  daily Yes Nathanael Bustillo DO   ranitidine (ZANTAC) 300 MG tablet TAKE 1 TABLET BY MOUTH TWO  TIMES DAILY Yes Nathanael Bustillo DO   gabapentin (NEURONTIN) 100 MG capsule Take 1 capsule by mouth daily. Jose Barefoot, DO   losartan (COZAAR) 100 MG tablet TAKE 1 TABLET BY MOUTH  DAILY Yes Nathanael Bustillo DO   aspirin 81 MG chewable tablet Take 1 tablet by mouth 2 times daily Yes Darinel Hurt, APRN - MEAGHAN   hydroxychloroquine (PLAQUENIL) 200 MG tablet Take 200 mg by mouth daily  Yes Historical Provider, MD   calcitRIOL (ROCALTROL) 0.25 MCG capsule Take 1 capsule by mouth every other day  Patient taking differently: Take 0.5 mcg by mouth  Yes Nathanael Bustillo DO   Probiotic Product (PROBIOTIC DAILY PO) Take 2 tablets by mouth daily Yes Historical Provider, MD   Multiple Vitamins-Minerals (THERAPEUTIC MULTIVITAMIN-MINERALS) tablet Take 1 tablet by mouth daily Yes Historical Provider, MD   ketoconazole (NIZORAL) 2 % cream Apply topically daily. BELLE Douglass - CNP   allopurinol (ZYLOPRIM) 100 MG tablet Take 1 tablet by mouth daily  Neal Ann DO   PROAIR  (22 Base) MCG/ACT inhaler Inhale 2 puffs into the lungs every 6 hours as needed for Wheezing (or Cough spells)  Neal Ann DO     Past Medical History:   Diagnosis Date    Acid reflux     Arthritis     Hypertension     Kidney function abnormal     at 37% function    Parkinson disease ([address]     PONV (postoperative nausea and vomiting)     Restless leg     Thyroid disease      Past Surgical History:   Procedure Laterality Date    APPENDECTOMY      AV FISTULA REPAIR Left 03/14/2018    BLADDER REPAIR      more than 10 yrs ago.  BREAST SURGERY      lt. Bx    COLONOSCOPY  1/30/13    DR. Nguyễn Rodriguez ELBOW SURGERY      part of pin left in    ENDOSCOPY, COLON, DIAGNOSTIC      ESOPHAGUS SURGERY      x2    EYE SURGERY      bilateral cat    FOOT SURGERY      HYSTERECTOMY      KNEE ARTHROPLASTY Right 8-    KNEE ARTHROSCOPY Right 5 7 15    medial and lateral menisecomty, synovectomy, chondroplasty    OTHER SURGICAL HISTORY      hamstring surgery    [place] AV ANAST,UP ARM BASILIC VEIN TRANSPOSIT Left 3/14/2018    TRANSPOSITION STAGE 2  AV FISTULA - LEFT ARM performed by Jolene Burns [facility] at [facility] [address] Right     x2  rotator cuff tear repair     No family history on file.     CareTeam (Including outside providers/suppliers regularly involved in providing care):   Patient Care Team:  Neal Ann DO as PCP - [address] [facility],  as PCP - [facility] Attributed Provider    Wt Readings from Last 3 Encounters:   11/12/18 161 lb (73 kg)   07/05/18 170 lb (77.1 kg)   06/25/18 170 lb (77.1 kg)     Vitals:    11/12/18 1533 11/12/18 1542   BP: (!) 166/88 (!) 148/80   Site: Right Upper Arm Right Upper Arm   Position: Sitting Sitting   Cuff Size: Large Adult Large Adult   Pulse: 99    Resp: 18    Temp: 98.1 °F (36.7 °C) Habits/Nutrition  Do you exercise for at least 20 minutes 2-3 times per week?: (!) No  Have you lost any weight without trying in the past 3 months?: (!) Yes  Do you eat fewer than 2 meals per day?: No  Have you seen a dentist within the past year?: (!) No  Body mass index is 31.44 kg/m². Health Habits/Nutrition Interventions:  · HAs had falls    ADL:  ADLs  In the past 7 days, did you need help from others to perform any of the following everyday activities?: None  In the past 7 days, did you need help from others to take care of any of the following?: (!) Food Preparation  ADL Interventions:  · can not cook due to dropping things    Personalized Preventive Plan   Current Health Maintenance Status  Immunization History   Administered Date(s) Administered    Influenza Virus Vaccine 10/30/2012    Influenza, High Dose (Fluzone 65 yrs and older) 09/09/2016, 09/19/2017    Pneumococcal 13-valent Conjugate ([id number]) 11/07/2017    Pneumococcal Polysaccharide ([id number]) 10/30/2012        Health Maintenance   Topic Date Due    DTaP/Tdap/Td vaccine (1 - Tdap) 06/07/1958    Shingles Vaccine (1 of 2 - 2 Dose Series) 06/07/1989    DEXA (modify frequency per FRAX score)  06/07/2004    TSH testing  11/09/2017    Flu vaccine (1) 09/01/2018    Potassium monitoring  03/14/2019    Creatinine monitoring  03/14/2019    Pneumococcal high/highest risk  Completed     Recommendations for Preventive Services Due: see orders and patient instructions/AVS.  .   Recommended screening schedule for the next 5-10 years is provided to the patient in written form: see Patient Instructions/AVS.

## 2018-12-05 NOTE — PROGRESS NOTES
She displays normal reflexes. No cranial nerve deficit. Coordination normal.          Skin: No rash noted. No erythema. Psychiatric: She has a normal mood and affect. ASSESSMENT/PLAN:    1. Obstipation  - XR ABDOMEN (KUB) (SINGLE AP VIEW); Future    2. Pressure injury of left buttock, stage 2  - mupirocin (BACTROBAN) 2 % ointment; Apply topically 2 times daily. Dispense: 30 g; Refill: GLORIA Fountain

## 2018-12-10 NOTE — TELEPHONE ENCOUNTER
Spoke to Elicia Gomez and she is waiting for her  to come home and then he will take her to [address] ED.

## 2018-12-13 PROBLEM — K72.90 HEPATIC FAILURE, WITHOUT COMA (HCC): Status: ACTIVE | Noted: 2018-01-01

## 2018-12-13 NOTE — CONSULTS
Depressed    Essential hypertension    Acquired hypothyroidism    Gastroesophageal reflux disease without esophagitis    Neuropathy    CKD (chronic kidney disease) stage 3, GFR 30-59 ml/min (HCC)    Skin lesion of left lower limb    Left knee pain    Hyperuricemia    Chronic gout of hand    Hepatic failure, without coma ([facility]       Plan:  No acute surgical issue based on CT and exam  Multisystem support  Will place TLC bedside  Remainder of management per Medical team and consultants      Physician Signature: Electronically signed by Dr. Daphne Ramirez  [phone] (p)

## 2018-12-13 NOTE — ED NOTES
Took pt to ct scan and she became very combative and started thrashing about the bed and pulling at things and yelling.   Dr. Monet Higgins notified and meds ordered     Mayda Hernandez RN  12/13/18 9554

## 2018-12-13 NOTE — CONSULTS
Alvino Dumont M.D. [facility]  Dr. Ebony Dodge M.D.,  Dr. Jaunita Carrel, M.D.,  Dr. Oanh Fernandez D.O.,  Dr Robyn Jordan D.O. ,  Dr Brayan Gray M.D. Dayanna Hinds  77 y.o.  female      Re: Liver failure  Requesting physician: Dr. Travis Pérez  Date:1:56 PM 12/13/2018          HPI: 60-year-old female patient brought to the hospital by hospital because of unresponsiveness at home. Patient is very somnolent and lethargic and virtually no verbal/does not participate in exam. No information taken from Hospital in other family members as well as from medical records and discussion with healthcare team.  Impression they should she was noted to be in acute liver failure with coagulopathy and change in mental status. It appears that patient has been slowly deteriorating in the past several days - according to the , patient has not been feeling well and has been experiencing decreased appetite with possible nausea home.  denies patient having emesis including deny hematemesis emesis of coffee-ground material. Patient has not been complaining of any abdominal pain but apparently had some abdominal discomfort. I reviewed this week on Monday patient was brought to the emergency department because of similar complaints of abdominal discomfort and at that time also had lower extremity edema for which she was placed on Lasix and sent home. No significant diarrhea. Apparently since yesterday patient is becoming even more lethargic and he has not had any significant oral intake. Patient has been decreasing her oral intake in the past 5-7 days. Patient's  denies patient to be ever an alcohol drinker nor has any history of recent alcohol intake. Only new medications recently are metoprolol and Lasix. According to the  patient has not taken any over-the-counter medications or illegal substances.   Laboratory work on presentation revealed coagulopathy with INR of 2.8 as well as significantly elevated transaminases with predominance of AST which all appeared to be new finding compared to beginning of this month. Information sources: family/medical record/health care team    PMHx:  Past Medical History:   Diagnosis Date    Acid reflux     Arthritis     Hypertension     Kidney function abnormal     at 37% function    Parkinson disease (HCC)     PONV (postoperative nausea and vomiting)     Restless leg     Thyroid disease        PSHx:  Past Surgical History:   Procedure Laterality Date    APPENDECTOMY      AV FISTULA REPAIR Left 03/14/2018    BLADDER REPAIR      more than 10 yrs ago.  BREAST SURGERY      lt.  Bx    COLONOSCOPY  1/30/13    DR. Enrique Robles ELBOW SURGERY      part of pin left in    ENDOSCOPY, COLON, DIAGNOSTIC      ESOPHAGUS SURGERY      x2    EYE SURGERY      bilateral cat    FOOT SURGERY      HYSTERECTOMY      KNEE ARTHROPLASTY Right 8-    KNEE ARTHROSCOPY Right 5 7 15    medial and lateral menisecomty, synovectomy, chondroplasty    OTHER SURGICAL HISTORY      hamstring surgery    [place] AV ANAST,UP ARM BASILIC VEIN TRANSPOSIT Left 3/14/2018    TRANSPOSITION STAGE 2  AV FISTULA - LEFT ARM performed by Danilo Montalvo MD at [place] [address] Right     x2  rotator cuff tear repair       Meds:  Current Facility-Administered Medications   Medication Dose Route Frequency Provider Last Rate Last Dose    dextrose 10 % 1,000 mL infusion   Intravenous Continuous Raenehermane Luis Rhodes,  mL/hr at 12/13/18 9108      sodium chloride flush 0.9 % injection 10 mL  10 mL Intravenous 2 times per day DO Cleopatra        sodium chloride flush 0.9 % injection 10 mL  10 mL Intravenous PRN Ly Herron DO        ipratropium-albuterol (DUONEB) nebulizer solution 1 ampule  1 ampule Inhalation Q4H WA Ly Herron DO        methylPREDNISolone sodium (SOLU-MEDROL) injection 80 mg  80 mg Intravenous Q8H Ly JANE

## 2018-12-13 NOTE — H&P
[facility]  Internal Medicine  -Resident History & Physical-    PCP:  Bruce Hanks DO  Admitting Physician:  Trista Muñoz DO  Consultants:  GI, Nephrology, cardiology  Chief Complaint:    Chief Complaint   Patient presents with    Altered Mental Status    Fall       History of Present Illness  Saritha Miller is a 77 y.o. female who presents to [address] [place] [address] complaining of AMS. Saritha Miller has a past medical history that includes CKD, hypothyroidism, Parkinsons disease, hypertension, GERD. Jocelyn Gravely presented to ER secondary to altered mental status. , son, and his girlfriend are bedside and provide information. They state that this morning she became progressively more somnolent. They state that over the past few weeks she would have the nonspecific complaint of \"not feeling well. \" Over the past few weeks she has had the complaint of leg weakness and swelling. She was seen in the ER 12/10/18 with nonspecific complaint of left sided abdominal pain along with \"not feeling well. \" Denies any diarrhea. She was diagnosed with exacerbation of CHF, given a prescription for lasix, and told to follow up with her cardiologist. She did undergo echocardiogram at her cardiologist's office yesterday. She was found to be in acute liver failure in ER with severe AMS. Her normal state of mind is A&Ox3. Family denies any new medications besides lasix. She did test positive for amphetamines on her UDS. Family denies any drug use, exposure, or use of any over the counter medications including Sudafed. They state that she does use acetaminophen occasionally. They deny any alcohol use. Family states she was answering few questions in the ER, but now she is completely obtunded. She does see Dr. Cole Childers as her cardiologist. She has left sided AV fistula but is not on dialysis. She normally follows with Dr. Amanda Kruse.  Family denies any liver issues in the past.      ER Course  Upon daily  8/25/16  Yes Historical Provider, MD   Probiotic Product (PROBIOTIC DAILY PO) Take 2 tablets by mouth daily   Yes Historical Provider, MD   Multiple Vitamins-Minerals (THERAPEUTIC MULTIVITAMIN-MINERALS) tablet Take 1 tablet by mouth daily   Yes Historical Provider, MD       Allergies  Pcn [penicillins]    Social Hx  Social History     Social History    Marital status:      Spouse name: Julia Lua Number of children: 1    Years of education: N/A     Occupational History    Not on file. Social History Main Topics    Smoking status: Never Smoker    Smokeless tobacco: Never Used    Alcohol use No    Drug use: No    Sexual activity: Not on file     Other Topics Concern    Not on file     Social History Narrative    No narrative on file       Review of Systems  Unable to obtain    Physical Examination  Vitals:  /73   Pulse 67   Temp 97.9 °F (36.6 °C) (Axillary)   Resp 21   Ht 5' (1.524 m)   Wt 164 lb 4.8 oz (74.5 kg)   LMP  (LMP Unknown)   SpO2 98%   BMI 32.09 kg/m²   General Appearance:  A&Ox0. Lethargic, but agitated. HEENT:  NCAT; PERRL; conjunctiva pink, sclera clear  Neck:  no adenopathy, bruit, JVD, tenderness, masses, or nodules; supple, symmetrical, trachea midline, thyroid not enlarged  Lung:  clear to auscultation bilaterally; no use of accessory muscles; no rhonchi, rales, or crackles  Heart:  regular rate and regular rhythm without murmur, rub, or gallop  Abdomen:  soft, nontender, nondistended; normoactive bowel sounds; no organomegaly  Extremities:  extremities normal, atraumatic, no cyanosis or edema + Left AV fistula   Musculokeletal:  no joint swelling, no muscle tenderness. ROM normal in all joints of extremities.  +Buttock wound chronic  Neurologic:  mental status A&Ox0    Laboratory Data  Recent Results (from the past 24 hour(s))   POCT Glucose    Collection Time: 12/13/18  6:55 AM   Result Value Ref Range    Meter Glucose <40 (L) 74 - 99 mg/dL   EKG 12 Lead

## 2018-12-13 NOTE — CONSULTS
performed by Christian Parra MD at [address] Right     x2  rotator cuff tear repair       Current Medications:    Current Facility-Administered Medications: dextrose 10 % 1,000 mL infusion, , Intravenous, Continuous  sodium chloride flush 0.9 % injection 10 mL, 10 mL, Intravenous, 2 times per day  sodium chloride flush 0.9 % injection 10 mL, 10 mL, Intravenous, PRN  ipratropium-albuterol (DUONEB) nebulizer solution 1 ampule, 1 ampule, Inhalation, Q4H WA  methylPREDNISolone sodium (SOLU-MEDROL) injection 80 mg, 80 mg, Intravenous, Q8H  lactulose enema, , Rectal, Once    Allergies:  Pcn [penicillins]    Social History:    Limited by medical condition     Family History:   Limited by medical condition     Review of Systems:   Limited by medical condition      Physical exam:   Constitutional:  VITALS:  BP (!) 157/84   Pulse 77   Temp 97.9 °F (36.6 °C) (Axillary)   Resp 18   Ht 5' (1.524 m)   Wt 164 lb 4.8 oz (74.5 kg)   LMP  (LMP Unknown)   SpO2 95%   BMI 32.09 kg/m²     Gen: agitated   Eyes: does not open eyes on exam  HEENT: atraumatic/normocephalic,dry mucus membranes  Neck: supple, patient is too agitated to appreciate JVD  Lungs: decreased at bases, equal lung expansion, poor effort  CV: RRR, no rub  Abdomen: soft, nontender, nondistended, normoactive bowel sounds  Extremitiy: No edema  : +leiva   Skin: no rash, tugor wnl  Neuro: limited by medical condition  Psych: agitated         Data:       Last 3 CMP:    Recent Labs      12/10/18   1749  12/13/18   0715  12/13/18   0756  12/13/18   0847   NA  142  140   --    --    K  4.4  6.1*   --    --    CL  105  100   --    --    CO2  23  13*   --    --    BUN  28*  66*   --    --    CREATININE  2.8*  4.8*   --    --    GLUCOSE  104*  215*  86  86   CALCIUM  10.4*  9.7   --    --    PROT  6.5  6.0*   --    --    LABALBU  4.1  4.1   --    --    BILITOT  0.6  2.2*   --    --    ALKPHOS  71  88   --    --    AST  33*  >7,000*   --    --    ALT understanding and was willing to proceed with dialysis  Returned and discussed with  at bedside who agreed with plan    2 Hyperkalemia  With acute kidney injury    3 Metabolic acidosis  With renal failure, lactic acidosis  Decreased lactic acid clearance with liver failure    4 Liver failure  Infectious vs ischemic per GI    5 Hypertension with CKD I-IV  Blood pressure under acceptable control  Follow, avoid hypotension  Hold ARB    6 Volume overload  Recent diagnosis of CHF  Likely intravascularly depleted at this time    Patient is critically ill  Discussed with primary team    Thank you for the opportunity to participate in the care of Ms Kay Walton    ______________________________      Jona Fowler MD  12/13/2018  1:57 PM

## 2018-12-13 NOTE — ED PROVIDER NOTES
normal strength. No sensory deficit. GCS eye subscore is 3. GCS verbal subscore is 5. GCS motor subscore is 6. Skin: Skin is warm and dry. No rash noted. She is not diaphoretic. Nursing note and vitals reviewed. Procedures    MDM    ED Course as of Dec 13 1040   Thu Dec 13, 2018   9860 7:38 AM  I received this patient at sign out from Dr. Gigi Mcnamara. I have discussed the patient's initial exam, treatment and plan of care with the out going physician. I have introduced my self to the patient / family and have answered their questions to this point. I have examined the patient myself and reviewed ordered tests / medications and  reviewed any available results to this point. If a resident is involved in the Emergency Department care, I have discussed my findings and plan with them as well. Patient in cervical collar, resting comfortably. Denying pain anywhere at this time. He is awake. Oriented to person. Heart rate regular, lungs are clear anteriorly. No sign of acute head or face injury. Abdomen soft and nontender. Arms and legs. Neurovascular intact and had no obvious signs of acute bony or joint injury throughout palpation of all 4 extremities. We will repeat her blood sugar  [NC]   0843 Patient awake, continues to repeat the word okay. A little more agitated now. Still confused now. Son is at bedside stating the patient was diagnosed with congestive heart failure a few days ago and saw her cardiologist and had an echo. Patient has not eaten or drank much in the last 2-3 days because of poor appetite, has not been taking her medicines. States that she had become somewhat confused yesterday and they were going to bring her to the hospital but she seemed to calm down yesterday and got tired and fell asleep so they let her sleep to the night.  They decided to bring her in today when she woke up still confused in the same fashion and was weaker and her fall was actually a slump to the ground with no injury or with redemonstration of scattered   hypodense lesions, the largest of which measure fluid attenuation and   are likely cysts. Consider further evaluation with ultrasound or MRI   abdomen with contrast.   3. Small right and trace left pleural effusions. CT Cervical Spine WO Contrast   Final Result   The study is moderately motion degraded at the lower cervical spine. No definite acute displaced cervical spine fracture is identified. CT Head WO Contrast   Final Result   1. No acute intracranial hemorrhage or mass effect. 2. 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                   ------------------------- NURSING NOTES AND VITALS REVIEWED ---------------------------  Date / Time Roomed:  12/13/2018  6:52 AM  ED Bed Assignment:  01/01    The nursing notes within the ED encounter and vital signs as below have been reviewed. Patient Vitals for the past 24 hrs:   BP Temp Temp src Pulse Resp SpO2 Height Weight   12/13/18 0851 128/73 96.3 °F (35.7 °C) CORE 71 18 100 % - -   12/13/18 0753 102/61 95.7 °F (35.4 °C) CORE 71 18 100 % - -   12/13/18 0706 (!) 156/90 93.9 °F (34.4 °C) CORE 72 20 100 % 5' (1.524 m) 160 lb (72.6 kg)       Oxygen Saturation Interpretation: Normal      ------------------------------------------ PROGRESS NOTES ------------------------------------------  Re-evaluation(s):  Time: 1030 am.  Patients symptoms are improving  Repeat physical examination is improved, more sedate        I have spoken with the patient and family and discussed todays results, in addition to providing specific details for the plan of care and counseling regarding the diagnosis and prognosis. Their questions are answered at this time and they are agreeable with the plan.         This patient's ED course included: a personal history and physicial examination, re-evaluation prior to disposition, multiple bedside re-evaluations, IV medications, cardiac monitoring, continuous pulse oximetry and complex medical decision making and emergency management    This patient has remained hemodynamically stable and been closely monitored during their ED course. Please note that the withdrawal or failure to initiate urgent interventions for this patient would likely result in a life threatening deterioration or permanent disability. Accordingly this patient received 30 minutes of critical care time, excluding separately billable procedures. Systems at risk for deterioration include: neuro,cardio. Clinical Impression  1. Acute renal failure, unspecified acute renal failure type ([address]    2. Hyperkalemia    3. Dehydration    4. Hypoglycemia    5. Acute liver failure without hepatic coma    6.  Altered mental status, unspecified altered mental status type          Disposition  Patient's disposition: Admit to CCU/ICU  Patient's condition is critical.       Shon Mcdonough,   12/13/18 1042

## 2018-12-13 NOTE — ED NOTES
Report given to ICU RN  All questions and concerns answered.   To ICU via bed     Ana Angel RN  12/13/18 2994

## 2018-12-14 NOTE — PROGRESS NOTES
assess Hepatic Artery  - full serologic panel ordered  - NAC and D10 ordered  - INR elevated  - awaiting repeat LFTs/INR  - continue supportive care  - patient is a poor candidate for liver transplant due to her age, chronic medical comorbidities and current condition    2) Encephalopathy, multifactorial  - will continue xifaxan and lactulose enemas for treatment of hyperammonemia  - dialysis will help treat hyperammonemia  - CT head negative  - defer further management to primary    3) Coagulopathy  - suspect at least partially related to liver failure, possible DIC?/TTP?  - INR continues to increase  - Ddimer elevated and Fibrinogen decreased  - patient with anemia, thrombocytopenia, renal failure and encephalopathy  - recommend heme consult    4) Renal failure   - LAVELLE on CKD   - on HD  - nephrology managing       5) Shock  - pressor support per primary/cardiology    6) Multi-organ failure  - poor prognosis  - poor candidate for liver transplant  - management per respective specialists     7) Liver lesions   - likely liver cysts   - further management pending clinical outcome     Above recommendations are tentative at this time. Case will be discussed with attending physician. Brittney Noble DO  12/14/2018  8:01 AM     Pt seen and independently examined. D/w Dr. Tiki Love. Agree with physical exam and A&P. Given patient's age as well as severe cardiomyopathy with ejection fraction of 25% as well as current liver/renal failure/anemia/AMS as well as with appeared to be DIC, I believe patient will be unlikely to have meaningful recovery. Above has been discussed extensively with a patient's family at bedside - all questions answered to their satisfaction and they appeared to lean toward de-escalating cold status. There is no intervention planned at this time from GI POV.     Yordy Lopez MD  12/14/2018  11:56 AM

## 2018-12-14 NOTE — CONSULTS
[address]                                  CONSULTATION    PATIENT NAME: Randa Willoughby                   :        1940  MED REC NO:   [id number]                            ROOM:       IC12  ACCOUNT NO:   [de-identified]                           ADMIT DATE: 2018  PROVIDER:     Darby Dumont MD    CONSULT DATE:  2018    HISTORY OF PRESENT ILLNESS:  The patient is a 62-year-old lady who I  just saw two days ago in my office for the first time. I took care of her . The patient was apparently feeling worsening fatigue likely with lack of  energy. No complains of chest pain. She was seen at [facility] emergency room on Monday and she  had chest x-ray done reporting mild vascular congestion with small  effusion. She was advised to see a cardiologist.  She was discharged. The patient called my office to be seen on Tuesday and I saw her on same  day. I told her about I need to get echo in view of the findings of her  chest x-ray and her symptoms of fatigue and dyspnea. She came to the office for echo in the afternoon on Wednesday,  2018. She went home after that. She took a nap from 4 o'clock  till 8 o'clock. She woke up and she was very weak. She asked her   for melted cheese sandwich. Her  prepared it. She told  him after that, however, that she could not eat it and that she was  feeling very tired. Her  called the son. The son advised   to take her to the emergency room if she is not improving. The patient apparently woke up 6 o'clock yesterday morning and she was  lethargic with altered mental status. The  finally called 911 to  bring her to the hospital.    Her labs done in ER indicated markedly elevated liver enzymes with AST  around 6000. Bilirubin total was 1.9. Creatinine was 2.8.     She was admitted with diagnosis of acute liver failure. Cardiac consult was requested. I looked at her echocardiogram and it showed severe cardiomyopathy with  ejection fraction around 25%. I asked the nurse immediately to start her on Dobutrex drip in view of  her cardiomyopathy and worsening renal failure. The patient was lying flat today. She was obtunded. She did not  respond to any verbal stimuli. There is no history of excessive alcohol intake. Her glucose level in ER was 30 and the patient was given D10W and  recheck showed glucose level of 78. PAST MEDICAL HISTORY:  As above. History of hypertension. Acid reflux. Restless legs syndrome. Hypothyroidism. Hysterectomy. Appendectomy. HABITS:  No history of smoking or excessive alcohol drinking. MEDICATION PRIOR TO ADMISSION:  According to the chart, the patient is  on;  1. Toprol-XL. 2.  Lasix. 3.  Effexor. 4.  Levothyroxine. 5.  Zantac. 6.  Neurontin. 7.  Zyloprim. 8.  Cozaar. 9.  ProAir. 10.  Aspirin. 11.  Plaquenil. 12.  Multivitamin. REVIEW OF SYSTEMS:  I could not get anything from the patient due to her  mental status. PHYSICAL EXAMINATION:  GENERAL:  The patient was lying flat. She was obtunded with no response  to verbal stimuli. VITAL SIGNS:  Blood pressure 119/73, pulse 75, temperature 97.8. NECK:  No JVD. HEART:  Regular S1 and S2. No S3.  1/6 systolic murmur heard best at  the left sternal border. LUNGS:  Mildly diminished breath sounds on the bases. No rales, no  wheezing. ABDOMEN:  Soft. EXTREMITIES:  Almost no edema. Diminished pulses in feet. NEURO:  The patient is obtunded. No response to verbal stimuli. I could not find EKG in the chart and I asked the nurse to find one and  text it to me. LABORATORY DATA:  On admission, WBC 9.3, hemoglobin 9.7, platelet count  58. Troponin 0.06, INR 2.8, glucose less than 40, ALT 4660, AST as  above 7000.   Creatinine 4.8, sodium 140, potassium 6.1, BUN

## 2018-12-14 NOTE — PLAN OF CARE
Problem: Restraint Use - Nonviolent/Non-Self-Destructive Behavior:  Goal: Absence of restraint indications  Absence of restraint indications   Outcome: Not Met This Shift  Still reaching for tubes/lines  Goal: Absence of restraint-related injury  Absence of restraint-related injury   Outcome: Met This Shift

## 2018-12-14 NOTE — SIGNIFICANT EVENT
Extensive conversation had with family, Dr. Abhinav Aaron, and Dr. Helio Davalos about patient's grave prognosis. Komal Whitestone does have a living will. Family has been educated on patients multisystem organ failure. , Ras Jones, along with son, Jin Chin, are agreeable to withdrawing care and for Komal Whitestone to be made comfortable.      Electronically signed by Jose Hammond DO on 12/14/2018 at 12:49 PM

## 2018-12-14 NOTE — PROGRESS NOTES
Hypochromia 1+     Poikilocytes 1+     Ovalocytes 1+    Platelet Confirmation    Collection Time: 12/13/18  9:45 PM   Result Value Ref Range    Platelet Confirmation CONFIRMED    Comprehensive metabolic panel    Collection Time: 12/13/18 10:00 PM   Result Value Ref Range    Sodium 137 132 - 146 mmol/L    Potassium 4.5 3.5 - 5.0 mmol/L    Chloride 93 (L) 98 - 107 mmol/L    CO2 18 (L) 22 - 29 mmol/L    Anion Gap 26 (H) 7 - 16 mmol/L    Glucose 178 (H) 74 - 99 mg/dL    BUN 46 (H) 8 - 23 mg/dL    CREATININE 2.8 (H) 0.5 - 1.0 mg/dL    GFR Non-African American 16 >=60 mL/min/1.73    GFR African American 20     Calcium 7.5 (L) 8.6 - 10.2 mg/dL    Total Protein 4.6 (L) 6.4 - 8.3 g/dL    Alb 3.2 (L) 3.5 - 5.2 g/dL    Total Bilirubin 1.9 (H) 0.0 - 1.2 mg/dL    Alkaline Phosphatase 70 35 - 104 U/L    ALT 4,292 (H) 0 - 32 U/L    AST 6,287 (H) 0 - 31 U/L   Bilirubin, direct    Collection Time: 12/13/18 10:00 PM   Result Value Ref Range    Bilirubin, Direct 1.1 (H) 0.0 - 0.3 mg/dL   Indirect Bilirubin    Collection Time: 12/13/18 10:00 PM   Result Value Ref Range    Bilirubin, Indirect 0.8 0.0 - 1.0 mg/dL   Lactic Acid, Plasma    Collection Time: 12/13/18 11:15 PM   Result Value Ref Range    Lactic Acid 9.5 (HH) 0.5 - 2.2 mmol/L   CK    Collection Time: 12/13/18 11:15 PM   Result Value Ref Range    Total CK 2,595 (H) 20 - 180 U/L   DIRECT ANTIGLOBULIN TEST    Collection Time: 12/13/18 11:15 PM   Result Value Ref Range    Polyspecific Janie NEG    TYPE AND SCREEN    Collection Time: 12/13/18 11:15 PM   Result Value Ref Range    ABO/Rh A POS     Antibody Screen NEG    PREPARE RBC (CROSSMATCH) Number of Units: 2, 2 Units    Collection Time: 12/13/18 11:15 PM   Result Value Ref Range    Product Code Blood Bank [id number]     Description Blood Bank Red Blood Cells, Leuko-reduced     Unit Number [id number]     Dispense Status Blood Bank issued     Product Code Blood Bank [id number]     Description Blood Bank Red Blood Cells, particularly at the level of C6 and C7. There is normal cervical lordosis. Vertebral bodies maintain normal height. Alignment is maintained. Atlantodental distance is preserved. The craniocervical junction is normal in appearance. No acute displaced cervical spine fracture is identified. There is no prevertebral soft tissue edema. There is intervertebral disc space narrowing with hypertrophic endplate changes at V1-X7 and C6-C7. There is no significant osseous encroachment of the central canal and neural foramina. Visualized portions of bilateral lung apices are grossly clear. The study is moderately motion degraded at the lower cervical spine. No definite acute displaced cervical spine fracture is identified. Xr Chest Portable    Result Date: 12/14/2018  Reading location: 200 Indication: Shortness of breath. Comparison: Chest radiograph from 12/13/2018. Technique: Portable upright chest radiograph was obtained. Findings: The cardiomediastinal silhouette is stable in size and contours. There are bilateral low lung volumes with trace bilateral pleural effusions. There is no evidence of pneumothorax. Bilateral low lung volumes with trace bilateral pleural effusions. Xr Chest Portable    Result Date: 12/13/2018  Reading location:  200 HISTORY: Altered mental status, fall. A single frontal portable chest x-ray was obtained. The heart is at upper limits of normal in size. There are no findings of CHF. Suboptimal inspiration was obtained for the chest x-ray. There is no appreciable lung infiltrate or pleural effusion. 1. There is no acute cardiopulmonary disease. Xr Chest Portable    Result Date: 12/10/2018  LOCATION: 200 EXAM: XR CHEST PORTABLE COMPARISON: None HISTORY: Shortness of breath TECHNIQUE: Single frontal view of the chest was obtained. FINDINGS:  SUPPORT DEVICES: None. LUNGS: Prominent interstitial markings noted. PLEURA: Small bilateral effusions are identified.  LUNG VOLUMES: Satisfactory inspirator effort. MEDIASTINAL STRUCTURES: No lymphadenopathy. Normal aortic contour. HEART SIZE: Normal. BONES AND SOFT TISSUES: No fracture or soft tissue abnormality. Mild CHF with small effusions. Us Abdomen Limited    Result Date: 12/13/2018  Location:200 Exam: US ABDOMEN LIMITED, US DUP ABD PEL RETRO SCROT LIMITED Comparison: 12/13/2018 History:  Liver lesions Technique: Real-time, gray scale, color flow images of the right upper quadrant was obtained. Findings: The liver is normal  in echogenicity. Several small fluid signal cysts are noted measuring less than 2 cm in size. No intrahepatic biliary ductal dilation seen. The majority of the pancreas is obscured by bowel gas. The visualized portions of the pancreas are unremarkable. The gallbladder is unremarkable without stones or sludge. No focal wall thickening seen. Sonographic Morris sign was negative. The common bile duct measures 4.0 mm in greatest dimension. Screening examination of the right kidney is within normal limits. Duplex ultrasound: Normal directional flow in the portal and hepatic veins. 1. 2 small benign-appearing liver cysts. 2. Patent hepatic and portal veins. Us Extremity Joint Left Non Vasc Complete    Result Date: 12/13/2018  Location: 200  History: Swelling TECHNIQUE: Grayscale and color-flow imaging performed of the left upper extremity. FINDINGS: Subcutaneous edema is identified. There is hypoechoic fluid extending from the mid bicep region to the level of the forearm surrounding the majority of the fistula compatible with a hematoma. The fistula appears patent. Large hematoma surrounding the fistula. Us Dup Abd Pel Retro Scrot Limited    Result Date: 12/13/2018  Location:200 Exam: US ABDOMEN LIMITED, US DUP ABD PEL RETRO SCROT LIMITED Comparison: 12/13/2018 History:  Liver lesions Technique: Real-time, gray scale, color flow images of the right upper quadrant was obtained. Findings:  The liver is normal  in echogenicity. Several small fluid signal cysts are noted measuring less than 2 cm in size. No intrahepatic biliary ductal dilation seen. The majority of the pancreas is obscured by bowel gas. The visualized portions of the pancreas are unremarkable. The gallbladder is unremarkable without stones or sludge. No focal wall thickening seen. Sonographic Morris sign was negative. The common bile duct measures 4.0 mm in greatest dimension. Screening examination of the right kidney is within normal limits. Duplex ultrasound: Normal directional flow in the portal and hepatic veins. 1. 2 small benign-appearing liver cysts. 2. Patent hepatic and portal veins. Microbiology  Blood and urine sent        Assessment and Plan  Patient is a 77 y.o. female who presented with AMS. The active problem list is as follows:    · Multisystem organ failure  · Acute metabolic/hepatic encephalopathy  · Metabolic acidosis  · Acute liver failure ischemic vs. Infectious  · Lactic acidosis-- worsening  · HAGMA  · Severe Coagulopathy-- worsening  · Acute on chronic kidney disease stage IV -Unable to received dialysis through fistula  · Hyperkalemia  · Hypertension  · Acute on chronic macrocytic anemia  · Thrombocytopenia  · Elevated troponin  · Urine drug screen positive for amphetamines likely false positive from Zantac   · Chronic buttock wound     - GI has been consulted for acute liver failure. Check acute hepatitis panel. Acetylcysteine protocol started by GI. Low threshold for transfer to tertiary care center, however at this time questionable candidacy for transplant. She has been started on Xifaxan along with lactulose enemas q8 hrs. Trend ammonia. US liver showing 2 small benign appearing liver cysts and patent hepatic and protal veins.     - Nephrology has been consulted for LAVELLE on CKD. LAVELLE likely multifactorial, decreased po intake, lasix, ARB, liver failure. She will be placed on bicarb drip. She underwent dialysis yesterday.

## 2018-12-14 NOTE — PROGRESS NOTES
Called with question on AMS. Chart reviewed. Pt with signs of acute liver failure. US/Duplex normal other than 2 small cysts. No hepatic or portal vein thrombosis. SBP Good on arrival and since being at hospital to suggest ischemia. Tylenol level normal . No new meds other than the lasix that I can gather. No antibiotics given 12/10 that I see recorded. No mushroom ingestion reported. Agree with IV NAC. Ammonia 60-80 range on lactulose enemas. Acute low platelets and elevate INR. Stat repeat labs to compare to this am ordered. CT head normal but may benefit from MRI brain. There has been multiple cases of Acute Hepatitis A in the area recently that we have seen and I would question this with her with more decompensation given prior co-morbidities and CKD. Full liver serology pending. Pt is over age 75 and would not typically qualify for liver transplant given this fact. UDS positive for amphetamine but on zantac 300 at home which is known to give false positive for this. Vit K already given. Nursing asking for NG tube which at this point would come at risk given the low platelets and high INR. Will see how repeat labs this evening look. Can consider transfer to tertiary center and have low threshold for this if things continue to worsen and not turn around. No bleeding reported. I will add IV thiamine and check Vit B 12 and Folate given macrocytosis    Given unexplained lactic acidosis would have suspicion of mesenteric ischemia, recommend surgery follow, consider vascular study but difficult given borderline renal function, nephrology and surgery on board. Eosinophils were normal to 0.00. Our service will follow.     Nayeli Pearson D.O.  12/13/18

## 2018-12-14 NOTE — PLAN OF CARE
Problem: Restraint Use - Nonviolent/Non-Self-Destructive Behavior:  Goal: Absence of restraint indications  Absence of restraint indications   Outcome: Met This Shift  Patient on morphine drip  Goal: Absence of restraint-related injury  Absence of restraint-related injury   Outcome: Met This Shift

## 2018-12-15 NOTE — PROGRESS NOTES
degraded. Lower thorax:  Limited evaluation of the lower chest demonstrates a small right and trace left pleural effusions. Peritoneum: There is a trace amount of free fluid within the right paracolic gutter and pelvis. There is no evidence of pneumoperitoneum. Hepatobiliary: The liver is normal morphology. Redemonstrated are multiple hypodense lesions within the liver, the largest of which measure fluid attenuation, possibly cyst. The gallbladder is surgically absent. There is mild nonspecific dilatation of the common bile duct, likely postsurgical. Pancreas: Unremarkable. Spleen: Unremarkable. Gastrointestinal: The distal esophagus, stomach, duodenum are normal in appearance. The small bowel loops are normal in caliber. The appendix is surgically absent. The colon is normal in caliber and grossly unremarkable. Adrenal glands: Unremarkable. Genitourinary: Bilateral kidneys are normal in morphology and symmetric in size. There is a exophytic fluid attenuation cyst arising from the midpole the right kidney. There is no renal calculus, hydronephrosis, or hydroureter bilaterally. The urinary bladder is partially collapsed around a Gustafson catheter. The uterus is surgically absent. Vascular: The abdominal aorta is normal in caliber. Lymph nodes: No abdominal, retroperitoneal, or pelvic lymphadenopathy. Bones: No suspicious lytic or blastic osseous lesion. Soft tissues: There small fat-containing bilateral inguinal hernias. 1. Trace amount of nonspecific free fluid within the right paracolic gutter and pelvis. 2. Liver is normal in morphology with redemonstration of scattered hypodense lesions, the largest of which measure fluid attenuation and are likely cysts. Consider further evaluation with ultrasound or MRI abdomen with contrast. 3. Small right and trace left pleural effusions.     Ct Abdomen Pelvis Wo Contrast Additional Contrast? None    Result Date: 12/10/2018  LOCATION:200 EXAM: CT ABDOMEN PELVIS WO CONTRAST effusions. There is no evidence of pneumothorax. Bilateral low lung volumes with trace bilateral pleural effusions. Xr Chest Portable    Result Date: 12/13/2018  Reading location:  200 HISTORY: Altered mental status, fall. A single frontal portable chest x-ray was obtained. The heart is at upper limits of normal in size. There are no findings of CHF. Suboptimal inspiration was obtained for the chest x-ray. There is no appreciable lung infiltrate or pleural effusion. 1. There is no acute cardiopulmonary disease. Xr Chest Portable    Result Date: 12/10/2018  LOCATION: 200 EXAM: XR CHEST PORTABLE COMPARISON: None HISTORY: Shortness of breath TECHNIQUE: Single frontal view of the chest was obtained. FINDINGS:  SUPPORT DEVICES: None. LUNGS: Prominent interstitial markings noted. PLEURA: Small bilateral effusions are identified. LUNG VOLUMES: Satisfactory inspirator effort. MEDIASTINAL STRUCTURES: No lymphadenopathy. Normal aortic contour. HEART SIZE: Normal. BONES AND SOFT TISSUES: No fracture or soft tissue abnormality. Mild CHF with small effusions. Us Abdomen Limited    Result Date: 12/13/2018  Location:200 Exam: US ABDOMEN LIMITED, US DUP ABD PEL RETRO SCROT LIMITED Comparison: 12/13/2018 History:  Liver lesions Technique: Real-time, gray scale, color flow images of the right upper quadrant was obtained. Findings: The liver is normal  in echogenicity. Several small fluid signal cysts are noted measuring less than 2 cm in size. No intrahepatic biliary ductal dilation seen. The majority of the pancreas is obscured by bowel gas. The visualized portions of the pancreas are unremarkable. The gallbladder is unremarkable without stones or sludge. No focal wall thickening seen. Sonographic Morris sign was negative. The common bile duct measures 4.0 mm in greatest dimension. Screening examination of the right kidney is within normal limits.  Duplex ultrasound: Normal directional flow in the portal and

## 2018-12-15 NOTE — PLAN OF CARE
Patient discussed with her son. Currently DNR CC/ morphine drip  No immediate plans for intervention from GI POV. Will see PRN - please, call with questions/concerns.   Thank you    Henny Summers MD  12/15/2018  10:06 AM

## 2018-12-15 NOTE — PROGRESS NOTES
Progress Note  12/15/2018 10:49 AM  Subjective:   Admit Date: 12/13/2018  PCP: Dakotah Ley,   Interval History: Patient examined family at bedside , pale appearing , gasping for breath     Diet:      Data:   Scheduled Meds:   sodium chloride flush  10 mL Intravenous 2 times per day     Continuous Infusions:   morphine infusion 1 mg/mL 8 mg/hr (12/15/18 0830)     PRN Meds:glycopyrrolate, LORazepam, sodium chloride flush  I/O last 3 completed shifts: In: 1375.8 [I.V.:684.9; Blood:442.5; IV Piggyback:248.4]  Out: 250 [Urine:250]  No intake/output data recorded. Intake/Output Summary (Last 24 hours) at 12/15/18 1049  Last data filed at 12/15/18 0600   Gross per 24 hour   Intake           443.89 ml   Output              250 ml   Net           193.89 ml     CBC:   Recent Labs      12/13/18   0729  12/13/18   2145  12/14/18   0855   WBC  9.3  6.3  8.5   HGB  9.7*  7.5*  9.8*   PLT  58*  31*  38*     BMP:  Recent Labs      12/13/18   2200  12/14/18   0130  12/14/18   0855   NA  137  139  137   K  4.5  4.0  4.1   CL  93*  93*  91*   CO2  18*  19*  20*   BUN  46*  48*  52*   CREATININE  2.8*  3.5*  4.0*   GLUCOSE  178*  180*  150*     Hepatic: Recent Labs      12/13/18   0715  12/13/18   2200  12/14/18   0855   AST  >7,000*  6,287*  5,339*  5,541*   ALT  4,664*  4,292*  4,019*  4,108*   BILITOT  2.2*  1.9*  2.9*  3.0*   ALKPHOS  88  70  82  80     Troponin:   Recent Labs      12/13/18   0715  12/13/18   1328  12/13/18   1855   TROPONINI  0.06*  0.07*  0.07*     BNP: No results for input(s): BNP in the last 72 hours. Lipids:   Recent Labs      12/14/18   0855   CHOL  79   HDL  39     ABGs: No results found for: PHART, PO2ART, UGX1NWP  INR:   Recent Labs      12/13/18   0715  12/14/18   0855   INR  2.8  >10.0*       -----------------------------------------------------------------  RAD: Ct Abdomen Pelvis Wo Contrast    Result Date: 12/13/2018  Location: 200 Indication: Hepatitis.  Comparison: CT abdomen focal wall thickening seen. Sonographic Morris sign was negative. The common bile duct measures 4.0 mm in greatest dimension. Screening examination of the right kidney is within normal limits. Duplex ultrasound: Normal directional flow in the portal and hepatic veins. 1. 2 small benign-appearing liver cysts. 2. Patent hepatic and portal veins. Us Extremity Joint Left Non Vasc Complete    Result Date: 12/13/2018  Location: 200  History: Swelling TECHNIQUE: Grayscale and color-flow imaging performed of the left upper extremity. FINDINGS: Subcutaneous edema is identified. There is hypoechoic fluid extending from the mid bicep region to the level of the forearm surrounding the majority of the fistula compatible with a hematoma. The fistula appears patent. Large hematoma surrounding the fistula. Us Dup Abd Pel Retro Scrot Limited    Result Date: 12/13/2018  Location:200 Exam: US ABDOMEN LIMITED, US DUP ABD PEL RETRO SCROT LIMITED Comparison: 12/13/2018 History:  Liver lesions Technique: Real-time, gray scale, color flow images of the right upper quadrant was obtained. Findings: The liver is normal  in echogenicity. Several small fluid signal cysts are noted measuring less than 2 cm in size. No intrahepatic biliary ductal dilation seen. The majority of the pancreas is obscured by bowel gas. The visualized portions of the pancreas are unremarkable. The gallbladder is unremarkable without stones or sludge. No focal wall thickening seen. Sonographic Morris sign was negative. The common bile duct measures 4.0 mm in greatest dimension. Screening examination of the right kidney is within normal limits. Duplex ultrasound: Normal directional flow in the portal and hepatic veins. 1. 2 small benign-appearing liver cysts. 2. Patent hepatic and portal veins.        Objective:   Vitals: BP (!) 49/38   Pulse 80   Temp 96 °F (35.6 °C) (Axillary)   Resp 13   Ht 5' (1.524 m)   Wt 170 lb 3.2 oz (77.2 kg)   LMP  (LMP

## 2018-12-16 LAB
HEPARIN PF4 ANTIBODY: 0.07 OD
HERPES TYPE 1/2 IGM COMBINED: 0.23 IV
HERPES TYPE I/II IGG COMBINED: 17.9 IV
HSV 1 GLYCOPROTEIN G AB IGG: 6.71 IV
HSV 2 GLYCOPROTEIN G AB IGG: 0.22 IV
LIVER-KIDNEY MICROSOME-1 AB IGG: 1.8 U (ref 0–24.9)
VARICELLA ZOSTER AB IGM: 0.02 ISR

## 2018-12-17 LAB
AMPHETAMINES, URINE: <50 NG/ML
METHAMPHETAMINE, URINE: <200 NG/ML
METHYLENEDIOXYAMPHETAMINE, UR: <200 NG/ML
METHYLENEDIOXYETHYLAMPHETAMINE, UR: <200 NG/ML
METHYLENEDIOXYMETHAMPHETAMINE, UR: <200 NG/ML

## 2018-12-19 LAB
BLOOD CULTURE, ROUTINE: NORMAL
Lab: NORMAL
REPORT: NORMAL
THIS TEST SENT TO: NORMAL

## 2018-12-20 LAB
CYTOMEGALOVIRUS IGM ANTIBODY: NORMAL
TOXOPLASMA IGM ANTIBODY: NORMAL
TOXOPLASMOSIS IGG AB: NORMAL

## (undated) DEVICE — STETHOSCOPE FLX BELL SGL HD CHROM PLT

## (undated) DEVICE — CANNULA INJ L2.5IN BLNT TIP 3MM CLR BODY W/ 1 W VLV DLP

## (undated) DEVICE — SOLUTION IV IRRIG WATER 1000ML POUR BRL 2F7114

## (undated) DEVICE — ELECTRODE PT RET AD L9FT HI MOIST COND ADH HYDRGEL CORDED

## (undated) DEVICE — SOLUTION IV IRRIG POUR BRL 0.9% SODIUM CHL 2F7124

## (undated) DEVICE — CONTROL SYRINGE LUER-LOCK TIP: Brand: MONOJECT

## (undated) DEVICE — STANDARD HYPODERMIC NEEDLE,ALUMINUM HUB: Brand: MONOJECT

## (undated) DEVICE — FEEDING TUBE • RADIOPAQUE: Brand: ARGYLE

## (undated) DEVICE — ADAPTER CATH WHT DISP FOR ANES

## (undated) DEVICE — 3M(TM) MEDIPORE(TM) +PAD SOFT CLOTH ADHESIVE WOUND DRESSING 3571: Brand: 3M™ MEDIPORE™

## (undated) DEVICE — NEEDLE SPNL 22GA L5IN BLK HUB S STL W/ QNCKE PNT W/OUT

## (undated) DEVICE — LABEL MED 4 IN SURG PANEL W/ PEN STRL

## (undated) DEVICE — CLIP INT SM TI EZ LD LIG SYS WECK HORZ

## (undated) DEVICE — SET SURG INSTR MINI VASC

## (undated) DEVICE — GOWN,SIRUS,FABRNF,L,20/CS: Brand: MEDLINE

## (undated) DEVICE — SKIN AFFIX SURG ADHESIVE 72/CS 0.55ML: Brand: MEDLINE

## (undated) DEVICE — SURGICAL PROCEDURE PACK VASC MAJ CUST

## (undated) DEVICE — DRAPE SURGICAL HAND PROX AURORA

## (undated) DEVICE — SET CLAMP NEONATAL VASCUALR

## (undated) DEVICE — PEN: MARKING STD 100/CS: Brand: MEDICAL ACTION INDUSTRIES

## (undated) DEVICE — DILATOR ART

## (undated) DEVICE — GAUZE,SPONGE,4"X4",16PLY,XRAY,STRL,LF: Brand: MEDLINE

## (undated) DEVICE — Z INACTIVE USE 2641837 CLIP LIG M BLU TI HRT SHP WIRE HORZ 600 PER BX

## (undated) DEVICE — TOWEL,OR,DSP,ST,BLUE,DLX,4/PK,20PK/CS: Brand: MEDLINE

## (undated) DEVICE — GOWN,SIRUS,FABRNF,XL,20/CS: Brand: MEDLINE

## (undated) DEVICE — DRIP REDUCTION MANIFOLD

## (undated) DEVICE — DOUBLE BASIN SET: Brand: MEDLINE INDUSTRIES, INC.

## (undated) DEVICE — STOCKINETTE,DOUBLE PLY,6X48,STERILE: Brand: MEDLINE

## (undated) DEVICE — VESSEL LOOPS,MAXI, RED: Brand: DEVON

## (undated) DEVICE — TOWEL,OR,DSP,ST,BLUE,STD,6/PK,12PK/CS: Brand: MEDLINE

## (undated) DEVICE — Device